# Patient Record
Sex: MALE | Employment: STUDENT | URBAN - METROPOLITAN AREA
[De-identification: names, ages, dates, MRNs, and addresses within clinical notes are randomized per-mention and may not be internally consistent; named-entity substitution may affect disease eponyms.]

---

## 2023-01-19 ENCOUNTER — ATHLETIC TRAINING SESSION (OUTPATIENT)
Dept: SPORTS MEDICINE | Facility: CLINIC | Age: 23
End: 2023-01-19
Payer: COMMERCIAL

## 2023-01-19 NOTE — PROGRESS NOTES
[facility]                      [facility]       Weekly Treatment Log       Name: Chinmay Cruz  Clinic Number: [id number]  Sport: Men's Baseball    ______________________________________________________________________    Visit Date: 1/20/23    Reason For Visit: Rehab  Affected Area: Right Shoulder    Chinmay completed the following exercises, treatments, and modalities.     Warm-Up Reps/Sets/Time Weight #   Heat 10 min         Ice (Post)                 Modality/Manual Therapy Time   Fascial Cupping 10 min                               Exercise Reps/Sets/Time Weight #   Shoulder Isometrics 3 x 10 ea    Body Blade 3 x 30 sec    Wall Slides 3 x 10                     Patient reports feeling okay. He threw lightly today at about 50% and it didn't feel great, but he was able to do. Plan to rest this weekend and do isometrics. Will reassess on Monday.     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 He originally believed it to be soreness or ramping up too hard, but now it has developed into moderate pain. He reports he has no pain when lifting, only when throwing or trying to pitch, even at 30-40% effort. He reports that he has no numbness or tingling and the pain is a dull achy pain, but sometimes is sharp when he throws. Most of his pain with motion is with external rotation. He reports there was no specific mechanism or throw that made it start, it just randomly came on.       Objective     Observations:  No bruising, swelling, or obvious deformity. POP over distal deltoid    ROM:  Forward Flexion: 5/5  Extension: 5/5  Abduction: 5/5  Adduction:  5/5  Horizontal Flexion: 4/5  Scaption: 4/5  Internal Rotation: 5/5  External Rotation: 4/5  Elevation: 5/5  Retraction: 5/5  Protraction: 5/5      MMTs:  Forward Flexion: 5/5  Extension: 5/5  Abduction: 4/5  Adduction: 5/5  Horizontal Flexion: 4/5  Scaption: 4/5  Internal Rotation: 5/5  External Rotation: 3/5  Elevation: 5/5  Retraction: 5/5  Protraction: 5/5      Special Tests:  Vinicius-Oliver: Positive  Yergason's: Positive  Conway (Active Compression): Positive  Biceps Load I: Negative  Biceps Load II: Negative  Pain Provocation: Negative  Dynamic Labral Shear: N/A  Empty Can: Positive  Apprehension: Negative  Neer: Positive  Speed: Positive  AC Shear: Negative  Sulcus Sign: Negative  Adson: N/A  Natalio: N/A   Brace: N/A  Tracey: N/A    Neuro Exam:  Not warranted at this time               Assessment     Initial Impression:  Likely impingement syndrome     Differential Diagnosis:  Deltoid sprain, external rotator injury      Plan     Plan to lower intensity of throwing and begin rehab. Plan to focus on reducing pain at deltoid if needed while increasing scapular control and appropriately increasing subacromial space.      Patient has been given shoulder isometric exercises to complete on his own over the weekend as well as exercises to do in the ATF.     Will refer if pain does not significantly improve with treatment plan.        King MAGALLANES, LAT, ATC, BRUNILDA    [facility] [place]

## 2023-01-24 ENCOUNTER — ATHLETIC TRAINING SESSION (OUTPATIENT)
Dept: SPORTS MEDICINE | Facility: CLINIC | Age: 23
End: 2023-01-24
Payer: COMMERCIAL

## 2023-01-25 DIAGNOSIS — M25.511 RIGHT SHOULDER PAIN, UNSPECIFIED CHRONICITY: Primary | ICD-10-CM

## 2023-01-26 ENCOUNTER — OFFICE VISIT (OUTPATIENT)
Dept: SPORTS MEDICINE | Facility: CLINIC | Age: 23
End: 2023-01-26
Payer: COMMERCIAL

## 2023-01-26 ENCOUNTER — APPOINTMENT (OUTPATIENT)
Dept: RADIOLOGY | Facility: OTHER | Age: 23
End: 2023-01-26
Attending: ORTHOPAEDIC SURGERY
Payer: COMMERCIAL

## 2023-01-26 VITALS
BODY MASS INDEX: 22.53 KG/M2 | DIASTOLIC BLOOD PRESSURE: 62 MMHG | WEIGHT: 170 LBS | SYSTOLIC BLOOD PRESSURE: 139 MMHG | HEIGHT: 73 IN

## 2023-01-26 DIAGNOSIS — M99.08 SOMATIC DYSFUNCTION OF RIB CAGE REGION: ICD-10-CM

## 2023-01-26 DIAGNOSIS — M25.511 ACUTE PAIN OF RIGHT SHOULDER: Primary | ICD-10-CM

## 2023-01-26 DIAGNOSIS — M99.02 SOMATIC DYSFUNCTION OF THORACIC REGION: ICD-10-CM

## 2023-01-26 DIAGNOSIS — M99.07 SOMATIC DYSFUNCTION OF UPPER EXTREMITY: ICD-10-CM

## 2023-01-26 DIAGNOSIS — M25.511 RIGHT SHOULDER PAIN, UNSPECIFIED CHRONICITY: ICD-10-CM

## 2023-01-26 DIAGNOSIS — M25.811 SHOULDER IMPINGEMENT, RIGHT: ICD-10-CM

## 2023-01-26 PROCEDURE — 73030 X-RAY EXAM OF SHOULDER: CPT | Mod: TC,RT

## 2023-01-26 PROCEDURE — 97110 THERAPEUTIC EXERCISES: CPT | Mod: S$GLB,,, | Performed by: ORTHOPAEDIC SURGERY

## 2023-01-26 PROCEDURE — 3078F PR MOST RECENT DIASTOLIC BLOOD PRESSURE < 80 MM HG: ICD-10-PCS | Mod: CPTII,S$GLB,, | Performed by: ORTHOPAEDIC SURGERY

## 2023-01-26 PROCEDURE — 73030 XR SHOULDER COMPLETE 2 OR MORE VIEWS RIGHT: ICD-10-PCS | Mod: 26,RT,, | Performed by: RADIOLOGY

## 2023-01-26 PROCEDURE — 99204 PR OFFICE/OUTPT VISIT, NEW, LEVL IV, 45-59 MIN: ICD-10-PCS | Mod: 25,S$GLB,, | Performed by: ORTHOPAEDIC SURGERY

## 2023-01-26 PROCEDURE — 73030 X-RAY EXAM OF SHOULDER: CPT | Mod: 26,RT,, | Performed by: RADIOLOGY

## 2023-01-26 PROCEDURE — 98926 PR OSTEOPATHIC MANIP,3-4 BODY REGN: ICD-10-PCS | Mod: S$GLB,,, | Performed by: ORTHOPAEDIC SURGERY

## 2023-01-26 PROCEDURE — 3075F PR MOST RECENT SYSTOLIC BLOOD PRESS GE 130-139MM HG: ICD-10-PCS | Mod: CPTII,S$GLB,, | Performed by: ORTHOPAEDIC SURGERY

## 2023-01-26 PROCEDURE — 1159F MED LIST DOCD IN RCRD: CPT | Mod: CPTII,S$GLB,, | Performed by: ORTHOPAEDIC SURGERY

## 2023-01-26 PROCEDURE — 1160F RVW MEDS BY RX/DR IN RCRD: CPT | Mod: CPTII,S$GLB,, | Performed by: ORTHOPAEDIC SURGERY

## 2023-01-26 PROCEDURE — 3078F DIAST BP <80 MM HG: CPT | Mod: CPTII,S$GLB,, | Performed by: ORTHOPAEDIC SURGERY

## 2023-01-26 PROCEDURE — 97110 PR THERAPEUTIC EXERCISES: ICD-10-PCS | Mod: S$GLB,,, | Performed by: ORTHOPAEDIC SURGERY

## 2023-01-26 PROCEDURE — 99204 OFFICE O/P NEW MOD 45 MIN: CPT | Mod: 25,S$GLB,, | Performed by: ORTHOPAEDIC SURGERY

## 2023-01-26 PROCEDURE — 99999 PR PBB SHADOW E&M-EST. PATIENT-LVL III: CPT | Mod: PBBFAC,,, | Performed by: ORTHOPAEDIC SURGERY

## 2023-01-26 PROCEDURE — 3008F BODY MASS INDEX DOCD: CPT | Mod: CPTII,S$GLB,, | Performed by: ORTHOPAEDIC SURGERY

## 2023-01-26 PROCEDURE — 3008F PR BODY MASS INDEX (BMI) DOCUMENTED: ICD-10-PCS | Mod: CPTII,S$GLB,, | Performed by: ORTHOPAEDIC SURGERY

## 2023-01-26 PROCEDURE — 3075F SYST BP GE 130 - 139MM HG: CPT | Mod: CPTII,S$GLB,, | Performed by: ORTHOPAEDIC SURGERY

## 2023-01-26 PROCEDURE — 1160F PR REVIEW ALL MEDS BY PRESCRIBER/CLIN PHARMACIST DOCUMENTED: ICD-10-PCS | Mod: CPTII,S$GLB,, | Performed by: ORTHOPAEDIC SURGERY

## 2023-01-26 PROCEDURE — 1159F PR MEDICATION LIST DOCUMENTED IN MEDICAL RECORD: ICD-10-PCS | Mod: CPTII,S$GLB,, | Performed by: ORTHOPAEDIC SURGERY

## 2023-01-26 PROCEDURE — 98926 OSTEOPATH MANJ 3-4 REGIONS: CPT | Mod: S$GLB,,, | Performed by: ORTHOPAEDIC SURGERY

## 2023-01-26 PROCEDURE — 99999 PR PBB SHADOW E&M-EST. PATIENT-LVL III: ICD-10-PCS | Mod: PBBFAC,,, | Performed by: ORTHOPAEDIC SURGERY

## 2023-01-26 RX ORDER — MELOXICAM 15 MG/1
15 TABLET ORAL DAILY
Qty: 30 TABLET | Refills: 0 | Status: SHIPPED | OUTPATIENT
Start: 2023-01-26

## 2023-01-26 NOTE — PROGRESS NOTES
"CC: right shoulder pain    22 y.o. Male presents today for evaluation of his right shoulder pain. 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 He notes increased pain with throwing and denies pain with lifting. He gestures to the anterior aspect of the right shoulder when asked where he hurts. He is accompanied today by his  who was present for the duration of the visit today.   How long: Approximately 2 weeks  What makes it better: Rest, lowering his throwing intensity, occasionally with lifting overhead  What makes it worse: Throwing above 50-60% intensity  Does it radiate: Denies  Attempted treatments: He has been working with his  including cupping, E-stim, and stabilizing/strengthening exercises for the shoulder. He denies taking medications for this problem.   Pain score: 1/10  Any mechanical symptoms: Denies  Feelings of instability: Denies  Affecting ADLs: Denies. Only feels when throwing >50% velocity.    PAST MEDICAL HISTORY:   History reviewed. No pertinent past medical history.    PAST SURGICAL HISTORY:   History reviewed. No pertinent surgical history.    FAMILY HISTORY:   History reviewed. No pertinent family history.    SOCIAL HISTORY:   Social History     Socioeconomic History    Marital status: Unknown       MEDICATIONS:     Current Outpatient Medications:     meloxicam (MOBIC) 15 MG tablet, Take 1 tablet (15 mg total) by mouth once daily., Disp: 30 tablet, Rfl: 0    ALLERGIES:   Review of patient's allergies indicates:  No Known Allergies     PHYSICAL EXAMINATION:  /62   Ht 6' 1" (1.854 m)   Wt 77.1 kg (170 lb)   BMI 22.43 kg/m²   Vitals signs and nursing note have been reviewed.  General: In no acute distress, well developed, well nourished, no diaphoresis  Eyes: EOM full and smooth, no eye redness or discharge  HENT: normocephalic and atraumatic, neck supple, trachea " midline, no nasal discharge, no external ear redness or discharge  Cardiovascular: 2+ and symmetric radial bilaterally, no LE edema  Lungs: respirations non-labored, no conversational dyspnea   Abd: non-distended, no rigidity  MSK: no amputation or deformity, no swelling of extremities  Neuro: AAOx3, CN2-12 grossly intact  Skin: No rashes, warm and dry  Psychiatric: cooperative, pleasant, mood and affect appropriate for age    SHOULDER: RIGHT  The affected shoulder is compared to the contralateral shoulder.    Observation:    CERVICAL SPINE  Normal head carriage. Normal thoracic kyphosis.  Full AROM in flexion, extension, sidebending, and rotation.    SHOULDER  No ecchymosis, edema, or erythema throughout the shoulder girdle.  No sternal, clavicular, or acromial deformities bilaterally.  No atrophy of the pectorals, deltoids, supraspinatus, infraspinatus, or biceps bilaterally.  No asymmetry of shoulders bilaterally.    ROM:  Active flexion to 180° on left and 180° on right.   Active abduction to 180° on left and 180° on right.    Active internal rotation to T7 on left and T7 on right.    Active external rotation to T4 on left and T4 on right.    No scapular dyskinesia or winging.    Tenderness:  No tenderness at the SC or AC joint  No tenderness over the clavicle   No tenderness over biceps tendon in the bicipital groove  No tenderness over subacromial space  + tenderness over the posterior lateral scapula  + tenderness just lateral to the biceps tendon anterior shoulder    Strength Testing:  Deltoid - 5/5 on left and 5/5 on right  Biceps - 5/5 on left and 5/5 on right  Triceps - 5/5 on left and 5/5 on right  Wrist extension - 5/5 on left and 5/5 on right  Wrist flexion - 5/5 on left and 5/5 on right   - 5/5 on left and 5/5 on right  Finger extension - 5/5 on left and 5/5 on right  Finger abduction - 5/5 on left and 5/5 on right    Special Tests:  Empty can test - negative  Full can test - negative  Bear hug  test - negative  Belly press test - negative  Resisted internal rotation - negative  Resisted external rotation - negative    Neer's test - negative  Hawkin's-Oliver test - positive    OAbdiels test - negative    Biceps load 1 test - negative  Biceps load 2 test - negative  Staples sheer test - negative    Speed's test - negative  Yergason's test - negative    Sulcus sign - none  AP load and shift laxity - none  Anterior apprehension test - negative    Posture:  Upright and Anterior head carriage  Gait: Non-antalgic     TART (Tissue texture abnormality, Asymmetry,  Restriction of motion and/or Tenderness) changes:    Head:     Cervical Spine  Thoracic Spine  Lumbar Spine   C1 Neutral T1 ERSR L1 Neutral   C2 Neutral T2 ERSR L2 Neutral   C3 Neutral T3 Neutral L3 Neutral   C4 Neutral T4 Neutral L4 Neutral   C5 Neutral T5 NSRRL L5 Neutral   C6 Neutral T6 NSRRL     C7 Neutral T7 NSRRL       T8 NSRRL       T9 Neutral       T10 Neutral       T11 Neutral       T12 Neutral       Ribs:  Superior rib 1 on the right  Myofascial restriction right upper ribcage    Upper Extremity:  Periscapular myofascial restriction on the right  Fascial herniated trigger points at the lateral superior scapular border  Fascial trigger bands lateral upper right arm  Myofascial restriction right pec muscle    Abdomen:    Pelvis:    Sacrum:    Lower Extremity:      Key   F= Flexed   E = Extended   R = Rotated   N = Neutral   S = Sidebent   TTA = tissue texture abnormality   L/R/B = left/right/bilateral (last letter)       Neurovascular Exam:  2+ radial pulses BL  Sensation intact to light touch in the distal median, radial, and ulnar nerve distributions bilaterally.  Spurlings test - negative  Lhermittes test - negative  Capillary refill intact <2 seconds in all digits bilaterally      IMAGIN. X-ray ordered due to right shoulder pain   2. X-ray images were reviewed personally by me and then directly with patient.  3. FINDINGS: X-ray images  obtained demonstrate no fracture or dislocation, no joint space narrowing  4. IMPRESSION: As above.       ASSESSMENT:      ICD-10-CM ICD-9-CM   1. Acute pain of right shoulder  M25.511 719.41   2. Shoulder impingement, right  M75.41 726.2   3. Somatic dysfunction of upper extremity  M99.07 739.7   4. Somatic dysfunction of thoracic region  M99.02 739.2   5. Somatic dysfunction of rib cage region  M99.08 739.8         PLAN:  1-2. Acute right shoulder pain/impingement -     - Jack is a [employer]  who admits to right anterior shoulder pain beginning approximately 2 weeks ago without known mechanism of injury. He notes increased pain when throwing above 50-60% velocity.      - XRs ordered in the office today and images were personally reviewed with the patient. See above for further detail.    - Symptoms, exam, and imaging are most consistent with impingement syndrome of the right shoulder secondary to poor body mechanics/neuromuscular firing. Labral testing did not elicit symptoms today.  We discussed the importance of decreasing inflammation and strengthening and stabilizing to help promote and maintain symptom improvement/resolution.  This is commonly accomplished with a short course of an anti-inflammatory and icing in addition to osteopathic manipulation, a home exercise program or physical therapy.    - Based on his description of pain/body language and somatic dysfunction identified on exam, I discussed osteopathic manipulation as a treatment option today. He consents to evaluation and treatment. See below.     - Meloxicam 15 mg daily for 2 weeks followed by as needed.    - Continue working with his athletic training on shoulder strengthening. Include cervicothoracic mobility.     - His  was present for the duration of the visit today and expressed understanding and agreement to the plan.       3-5. Somatic dysfunction of thoracic, rib, upper extremity regions -     - OMT 3-4 regions.  Oral consent obtained. Reviewed benefits and potential side effects. OMT indicated today due to signs and symptoms as well as local and remote somatic dysfunction findings and their related neurokinetic, lymphatic, fascial and/or arteriovenous body connections. OMT techniques used: Soft Tissue, Myofascial Release, Muscle Energy, High Velocity Low Amplitude, Still's Technique, and Fascial Distortion Model. Treatment was tolerated well. Improvement noted in segmental mobility post-treatment in dysfunctional regions. There were no adverse events and no complications immediately following treatment. Advised plenty of water to help alleviate soreness.      Future planning includes - possibly more OMT if helpful and if indicated, add formal PT    All questions were answered to the best of my ability and all concerns were addressed at this time.    Follow up in 1-2 weeks for above in the [facility] athletic training room, or sooner if needed.      This note is dictated using the M*Modal Fluency Direct word recognition program. There are word recognition mistakes that are occasionally missed on review.      Total time spent face-to face with patient counseling or coordinating care including prognosis, differential diagnosis, risks and benefits of treatment, instructions, compliance risk reductions as well as non-face-to-face time personally spent reviewing medial record, medical documentation, and coordination of care.     EST MINUTES X   [id number] 10-19    82335 20-29    75341 30-39    15353 40-54    NEW     34137 15-29    26876 30-44    03441 45-59 X   99205 60-74    PHONE      5-10    [id number] 11-20    93999 21-30

## 2023-01-30 NOTE — PROGRESS NOTES
[facility]                      [facility]       Weekly Treatment Log       Name: Neftaly Cruz  [facility] Number: [id number]  Sport: Men's Baseball    ___________________________________________________________    Visit Date: 1/28/2023    Reason For Visit: Rehab  Affected Area: Right Shoulder    Patient reports feeling not good. He reports his outing hurt very bad and he was about 6 mph lower than his normal velocity. He did report he felt like he had control of the ball and was able to put it where he wanted it, he just had pain coming over the top. He didn't throw a ton of breaking balls to avoid over producing pain. Plan to continue with rehab plan and will touch base with Dr. Tejeda again this week.  ___________________________________________________________    Visit Date: 1/27/2023    Reason For Visit: Rehab  Affected Area: Right Shoulder    Chinmay completed the following exercises, treatments, and modalities.     Warm-Up Reps/Sets/Time Weight #   Heat     Stretch                      Modality/Manual Therapy Time   Fascial Cupping 10 min                 Exercise Reps/Sets/Time Weight #   Shoulder Isometrics 3 x 10                Patient reports feeling decent. He reports not wanting to do a ton of rehab today so he doesn't overdo it to try and throw his live outing tomorrow.  ___________________________________________________________    Visit Date: 1/26/2023    Reason For Visit: Rehab  Affected Area: Right Shoulder    Chinmay completed the following exercises, treatments, and modalities.     Warm-Up Reps/Sets/Time Weight #   Heat     Stretch                        Exercise Reps/Sets/Time Weight #   Shoulder Isometrics 3 x 10    Standing ITYs 3 x 10    Prone Rows 3 x 10    Body Blade 3 x 30 sec    Banded IR/ER 3 x 10                     Patient reports feeling better after seeing Dr. Tejeda for the OMT. He felt immediate relief after the appointment and threw  a low volume at 70% comfortably without pain. Will reassess for his live outing on Saturday.  ___________________________________________________________    Visit Date: 1/25/2023    Reason For Visit: Rehab  Affected Area: Right Shoulder    Chinmay completed the following exercises, treatments, and modalities.     Warm-Up Reps/Sets/Time Weight #   Heat     Stretch                      Modality/Manual Therapy Time   Fascial Cupping 10 min   Ice Post throw              Patient reports feeling decent. 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 Tejeda tomorrow.  ___________________________________________________________    Visit Date: 1/24/2023    Reason For Visit: Rehab  Affected Area: Right Shoulder    Chinmay completed the following exercises, treatments, and modalities.     Warm-Up Reps/Sets/Time Weight #   Heat     Stretch                      Exercise Reps/Sets/Time Weight #   Shoulder Isometrics 3 x 10    Standing ITYs 3 x 10    Prone Rows 3 x 10    Body Blade 3 x 30 sec                          Patient reports feeling a little better today. He reports he can get up to about 60% intensity without any issues. Will try and throw his bullpen tomorrow even if it is at low intensity.  ___________________________________________________________    Visit Date: 1/23/2023    Reason For Visit: Rehab  Affected Area: Right Shoulder    Chinmay completed the following exercises, treatments, and modalities.     Warm-Up Reps/Sets/Time Weight #   Heat     Stretch                      Modality/Manual Therapy Time   Fascial Cupping 10 min                 Exercise Reps/Sets/Time Weight #   Shoulder Isometrics 3 x 10    Standing ITYs 3 x 10    Prone Rows 3 x 10    Body Blade 3 x 30 sec                          Patient reports feeling okay today, not great bunch not much worse. Throwing still doesn't feel very good over about 50% intensity. Plan to continue rehab plan and  refer to Dr. Tejeda this week.      King MAGALLANES, LAT, ATC, BRUNILDA    [facility] [place]

## 2023-02-01 ENCOUNTER — TELEPHONE (OUTPATIENT)
Dept: SPORTS MEDICINE | Facility: CLINIC | Age: 23
End: 2023-02-01
Payer: COMMERCIAL

## 2023-02-01 DIAGNOSIS — M25.519 PAIN IN UNSPECIFIED SHOULDER: ICD-10-CM

## 2023-02-01 DIAGNOSIS — M25.811 SHOULDER IMPINGEMENT, RIGHT: ICD-10-CM

## 2023-02-01 DIAGNOSIS — M25.511 ACUTE PAIN OF RIGHT SHOULDER: ICD-10-CM

## 2023-02-01 NOTE — TELEPHONE ENCOUNTER
Athletic Training Room Note 02/01/2023  [facility]    : King Gill    Physician: Nolan Tejeda DO      CC: Right shoulder pain     HPI: Chinmay is a [employer]  with complaints of right shoulder pain that has not improved with conservative treatments including OMT, NSAIDs, and consistent rehab with his . He gestures to the posterior aspect of the right shoulder when asked where he hurts today.  He denies any worsening of symptoms since his in office visit a few weeks ago, but has not been able to increase his pitching velocity as much as he would like.  He denies any elbow pain and denies any wrist pain.  Per his , there has been no change in his throwing mechanics and he is not compensating because of the pain.    Physical Exam:  Shoulder: right  The affected shoulder is compared to the contralateral shoulder.    Observation:    CERVICAL SPINE  Normal head carriage. + thoracic kyphosis.  Full AROM in flexion, extension, sidebending, and rotation.    SHOULDER  No ecchymosis, edema, or erythema throughout the shoulder girdle.  No sternal, clavicular, or acromial deformities bilaterally.  No atrophy of the pectorals, deltoids, supraspinatus, infraspinatus, or biceps bilaterally.  No asymmetry of shoulders bilaterally.    ROM:  Active flexion to 180° bilaterally.   Active abduction to 180° bilaterally.    Active internal rotation to T7 bilaterally.    Active external rotation to T4 bilaterally.    Scapular dyskinesia on the right    Tenderness:  No tenderness at the SC or AC joint  No tenderness over the clavicle   No tenderness over biceps tendon or bicipital groove  No tenderness over subacromial space  + tenderness along the posterior humeral head  + tenderness at the superior lateral scapular border    Strength Testing:  Deltoid - 5/5  Biceps - 5/5  Triceps - 5/5  Wrist extension - 5/5  Wrist flexion - 5/5    Special Tests:  Empty can test -  negative  Full can test - negative  Bear hug test - negative  Belly press test - negative  Resisted internal rotation - negative  Resisted external rotation - negative    Neer's test - negative  Hawkin's-Oliver test - positive    OAbdiels test - negative  Biceps load 1 test - negative  Biceps load 2 test - negative  Staples sheer test - negative    Speed's test - negative  Yergason's test - negative    Sulcus sign - none  AP load and shift laxity - none  Anterior apprehension test - negative  Posterior apprehension test - negative    Neurovascular Exam:  2+ radial pulses BL  Sensation intact to light touch in the distal median, radial, and ulnar nerve distributions bilaterally.  Capillary refill intact <2 seconds in all digits bilaterally    Assessment:  Right shoulder pain, most consistent with impingement but we need to rule out labral pathology      Plan:     He has not improved with conservative measures so far and is still struggling to return to his prior velocity due to his shoulder pain.    Medications - Continue with Mobic 15 mg daily as needed.     Exercises - Continue with scapular strengthening/stabilization    Referrals - Pending MRI results    Imaging - MRI-arthrogram right shoulder    Follow up - after MRI in office to discuss next steps.  If no intra-articular pathology is appreciated, we will continue with strengthening, stability, OMT if indicated.  If there is intra-articular pathology, we will discuss injection options.  If there is extensive damage that warrants at least a surgical discussion, we will set up that appointment.            This note was completed in the presence of the physician noted above    This note is dictated using the M*Modal Fluency Direct word recognition program. There are word recognition mistakes that are occasionally missed on review.

## 2023-02-05 ENCOUNTER — ATHLETIC TRAINING SESSION (OUTPATIENT)
Dept: SPORTS MEDICINE | Facility: CLINIC | Age: 23
End: 2023-02-05
Payer: COMMERCIAL

## 2023-02-06 ENCOUNTER — ATHLETIC TRAINING SESSION (OUTPATIENT)
Dept: SPORTS MEDICINE | Facility: CLINIC | Age: 23
End: 2023-02-06
Payer: COMMERCIAL

## 2023-02-06 NOTE — PROGRESS NOTES
[facility]                      [facility]     **This note is for the week of 1/29 to 2/4**    Weekly Treatment Log       Name: Neftaly Cruz  [facility] Number: [id number]  Sport: Men's Baseball    ___________________________________________________________    Visit Date: 2/4/23    Reason For Visit: Rehab  Affected Area: Right Shoulder    Chinmay completed the following exercises, treatments, and modalities.     Warm-Up Reps/Sets/Time Weight #   Heat w/ Stim 10 min                          Modality/Manual Therapy Time       Stretch 5 min   Jt. Mobs 5 min       Ice Post              Patient reports not feeling good. He reports he could not pitch without pain at all today. He tried to block it out if his mind and not focus on the pain, but he could not do it. It hurt too much. His velocity was back a little closer to normal, within 4mph, but he still hurt bad. Plan now is to shut down from throwing while continuing rehab and wait for his MRI and those results.  ___________________________________________________________    Visit Date: 2/3/23    Reason For Visit: Rehab  Affected Area: Right Shoulder    Chinmay completed the following exercises, treatments, and modalities.     Warm-Up Reps/Sets/Time Weight #   Heat 10 min                          Modality/Manual Therapy Time   Fascial Cupping 10 min   Stretch 5 min   Jt. Mobs 5 min       Ice Post              Patient reports feeling good today. He has had another day of getting to around 75% intensity and did not have any pain. He reports he is optimistic for tomorrow's outing based on how he has felt the last two to three days.    ___________________________________________________________    Visit Date: 2/2/23    Reason For Visit: Rehab  Affected Area: Right Shoulder    Chinmay completed the following exercises, treatments, and modalities.     Warm-Up Reps/Sets/Time Weight #   Heat 10 min                           Modality/Manual Therapy Time       Stretch 5 min   Jt. Mobs 5 min       Ice Post                Exercise Reps/Sets/Time Weight #   Wall [place] 3 x 10    Standing ITYs 3 x 12    Wall Circles 3 x 8    Iso Shoulders 3 x 12    Body Blade 3 x 30 sec    Banded IR/ER 3 x 12                                           Patient reports feeling decent today. He was able to throw with minimal pain today and was able to get up to about 70% intensity feeling good. Plan to continue rehab and assess for outing once we get to Saturday.  ___________________________________________________________    Visit Date: 1/31/23    Reason For Visit: Rehab  Affected Area: Right Shoulder    Chinmay completed the following exercises, treatments, and modalities.     Warm-Up Reps/Sets/Time Weight #   Heat 10 min                          Modality/Manual Therapy Time   Fascial Cupping 10 min   Stretch 5 min   Jt. Mobs 5 min       Ice Post                Exercise Reps/Sets/Time Weight #   Wall [place] 3 x 10    Standing ITYs 3 x 12    Prone Rows 3 x 12    Iso Shoulders 3 x 12    Body Blade 3 x 30 sec                Patient reports feeling no change from yesterday.  ___________________________________________________________    Visit Date: 1/30/23    Reason For Visit: Rehab  Affected Area: Right Shoulder    Chinmay completed the following exercises, treatments, and modalities.     Warm-Up Reps/Sets/Time Weight #   Heat 10 min                          Modality/Manual Therapy Time   Fascial Cupping 10 min   Stretch 5 min   Jt. Mobs 5 min       Ice Post                Exercise Reps/Sets/Time Weight #   Wall [place] 3 x 10    Standing ITYs 3 x 12    Prone Rows 3 x 12    Iso Shoulders 3 x 12    Body Blade 3 x 30 sec                                                Patient reports feeling not great today when throwing. His outing wasn't good over the weekend and he is worried he isn't moving in the right direction. Plan to reassess on 2/1 with Dr. Nolan Tejeda in  [facility].         King MAGALLANES, LAT, ATC, BRUNILDA    [facility] [place]

## 2023-02-06 NOTE — PROGRESS NOTES
[facility]                      [facility]       Weekly Treatment Log       Name: Neftaly Cruz  [facility] Number: [id number]  Sport: Men's Baseball    ___________________________________________________________    Visit Date: 2/11/2023    Reason For Visit: Rehab  Affected Area: Right Shoulder    Chinmay completed the following exercises, treatments, and modalities.     Warm-Up Reps/Sets/Time Weight #   Heat                           Modality/Manual Therapy Time   Stretch 5 min   Jt Mobs 5 min                           Exercise Reps/Sets/Time Weight #   Banded ER Walkouts 3 x 12    Banded IR Walkouts 3 x 12    Prone ITYs 3 x 12    Prone Ret w/ IR 3 x 12    No Monies 3 x 12    Rhythm Stab w/ band in ER 3 x 30 sec    Body Blade 3 x 30 sec                                      Patient reports feeling good.  ___________________________________________    Visit Date: 2/10/2023    Affected Area: Right Shoulder    Objective     Patient missed his MRI      Plan     Plan to try and reschedule. Will continue rehab in the meantime.   ___________________________________________________________    Visit Date: 2/9/2023    Reason For Visit: Rehab  Affected Area: Right Shoulder    Chinmay completed the following exercises, treatments, and modalities.     Warm-Up Reps/Sets/Time Weight #   Heat                           Modality/Manual Therapy Time   Stretch 5 min   Jt Mobs 5 min                           Exercise Reps/Sets/Time Weight #   Banded ER Walkouts 3 x 12    Banded IR Walkouts 3 x 12    Prone ITYs 3 x 12    Prone Ret w/ IR 3 x 12    No Monies 3 x 12    Rhythm Stab w/ band in ER 3 x 30 sec    Body Blade 3 x 30 sec                                      Patient reports feeling good today. He still has no issues outside of throwing and feels like his shoulder is stronger. He is ready to get his MRI tomorrow and see what is going on in the shoulder and start the next  steps.  ___________________________________________________________    Visit Date: 2/7/2023    Reason For Visit: Rehab  Affected Area: Right Shoulder    Chinmay completed the following exercises, treatments, and modalities.     Warm-Up Reps/Sets/Time Weight #   Heat                           Modality/Manual Therapy Time   Stretch 5 min   Jt Mobs 5 min   E-Stim                        Exercise Reps/Sets/Time Weight #   Banded ER Walkouts 3 x 12    Banded IR Walkouts 3 x 12    Prone ITYs 3 x 12    Prone Ret w/ IR 3 x 12    No Monies 3 x 12    Rhythm Stab w/ PB 3 x 30 sec    Body Blade 3 x 30 sec                                      Patient reports no change from yesterday  ___________________________________________________________    Visit Date: 2/6/2023    Reason For Visit: Rehab  Affected Area: Right Shoulder    Chinmay completed the following exercises, treatments, and modalities.     Warm-Up Reps/Sets/Time Weight #   Heat                           Modality/Manual Therapy Time   Stretch 5 min   Jt Mobs 5 min                           Exercise Reps/Sets/Time Weight #   Banded ER Walkouts 3 x 12    Banded IR Walkouts 3 x 12    Prone ITYs 3 x 12    Prone Ret w/ IR 3 x 12    No Monies 3 x 12    Rhythm Stab w/ PB 3 x 30 sec    Body Blade 3 x 30 sec                                      Patient reports feeling not much different than he did the previous days, but he does feel stronger. He can do a dry throwing motion without pain, but hasn't attempted to throw at all. He still has no issues in the weight room.        King MAGALLANES, LAT, ATC, BRUNILDA    [employer] [facility] [place]

## 2023-02-13 DIAGNOSIS — M25.511 ACUTE PAIN OF RIGHT SHOULDER: ICD-10-CM

## 2023-02-13 DIAGNOSIS — M25.519 PAIN IN UNSPECIFIED SHOULDER: ICD-10-CM

## 2023-02-17 ENCOUNTER — HOSPITAL ENCOUNTER (OUTPATIENT)
Dept: RADIOLOGY | Facility: HOSPITAL | Age: 23
Discharge: HOME | End: 2023-02-17
Attending: ORTHOPAEDIC SURGERY
Payer: COMMERCIAL

## 2023-02-17 DIAGNOSIS — M25.811 SHOULDER IMPINGEMENT, RIGHT: ICD-10-CM

## 2023-02-17 DIAGNOSIS — M25.519 PAIN IN UNSPECIFIED SHOULDER: ICD-10-CM

## 2023-02-17 DIAGNOSIS — M25.511 ACUTE PAIN OF RIGHT SHOULDER: ICD-10-CM

## 2023-02-17 PROCEDURE — 77002 NEEDLE LOCALIZATION BY XRAY: CPT | Mod: 26,RT,, | Performed by: RADIOLOGY

## 2023-02-17 PROCEDURE — 73222 MRI ARTHROGRAM SHOULDER WITH CONTRAST RIGHT: ICD-10-PCS | Mod: 26,RT,, | Performed by: RADIOLOGY

## 2023-02-17 PROCEDURE — 73222 MRI JOINT UPR EXTREM W/DYE: CPT | Mod: TC,RT

## 2023-02-17 PROCEDURE — 73222 MRI JOINT UPR EXTREM W/DYE: CPT | Mod: 26,RT,, | Performed by: RADIOLOGY

## 2023-02-17 PROCEDURE — 25500020 PHARM REV CODE 255: Performed by: ORTHOPAEDIC SURGERY

## 2023-02-17 PROCEDURE — A9585 GADOBUTROL INJECTION: HCPCS | Performed by: ORTHOPAEDIC SURGERY

## 2023-02-17 PROCEDURE — 77002 XR ARTHROGRAM SHOULDER RIGHT, INJECTION ONLY WITH MRI TO FOLLOW (XPD): ICD-10-PCS | Mod: 26,RT,, | Performed by: RADIOLOGY

## 2023-02-17 PROCEDURE — 23350 XR ARTHROGRAM SHOULDER RIGHT, INJECTION ONLY WITH MRI TO FOLLOW (XPD): ICD-10-PCS | Mod: RT,,, | Performed by: RADIOLOGY

## 2023-02-17 PROCEDURE — 23350 INJECTION FOR SHOULDER X-RAY: CPT | Mod: RT,,, | Performed by: RADIOLOGY

## 2023-02-17 PROCEDURE — 25000003 PHARM REV CODE 250: Performed by: ORTHOPAEDIC SURGERY

## 2023-02-17 RX ORDER — GADOBUTROL 604.72 MG/ML
5 INJECTION INTRAVENOUS
Status: COMPLETED | OUTPATIENT
Start: 2023-02-17 | End: 2023-02-17

## 2023-02-17 RX ORDER — LIDOCAINE HYDROCHLORIDE 10 MG/ML
4 INJECTION INFILTRATION; PERINEURAL ONCE
Status: COMPLETED | OUTPATIENT
Start: 2023-02-17 | End: 2023-02-17

## 2023-02-17 RX ADMIN — LIDOCAINE HYDROCHLORIDE 4 ML: 10 INJECTION, SOLUTION INFILTRATION; PERINEURAL at 01:02

## 2023-02-17 RX ADMIN — GADOBUTROL 5 ML: 604.72 INJECTION INTRAVENOUS at 01:02

## 2023-02-17 RX ADMIN — IOHEXOL 7 ML: 300 INJECTION, SOLUTION INTRAVENOUS at 01:02

## 2023-02-20 ENCOUNTER — OFFICE VISIT (OUTPATIENT)
Dept: SPORTS MEDICINE | Facility: CLINIC | Age: 23
End: 2023-02-20
Payer: COMMERCIAL

## 2023-02-20 VITALS
WEIGHT: 170 LBS | BODY MASS INDEX: 22.53 KG/M2 | HEIGHT: 73 IN | SYSTOLIC BLOOD PRESSURE: 125 MMHG | DIASTOLIC BLOOD PRESSURE: 75 MMHG | HEART RATE: 79 BPM

## 2023-02-20 DIAGNOSIS — M25.811 SHOULDER IMPINGEMENT, RIGHT: ICD-10-CM

## 2023-02-20 DIAGNOSIS — M25.511 ACUTE PAIN OF RIGHT SHOULDER: Primary | ICD-10-CM

## 2023-02-20 DIAGNOSIS — M67.819 TENDINOSIS OF ROTATOR CUFF: ICD-10-CM

## 2023-02-20 DIAGNOSIS — S43.431A LABRAL TEAR OF SHOULDER, RIGHT, INITIAL ENCOUNTER: ICD-10-CM

## 2023-02-20 PROCEDURE — 20611 PR DRAIN/ASP/INJECT MAJOR JOINT/BURSA W/US GUIDANCE: ICD-10-PCS | Mod: RT,S$GLB,, | Performed by: ORTHOPAEDIC SURGERY

## 2023-02-20 PROCEDURE — 99214 PR OFFICE/OUTPT VISIT, EST, LEVL IV, 30-39 MIN: ICD-10-PCS | Mod: 25,S$GLB,, | Performed by: ORTHOPAEDIC SURGERY

## 2023-02-20 PROCEDURE — 3074F SYST BP LT 130 MM HG: CPT | Mod: CPTII,S$GLB,, | Performed by: ORTHOPAEDIC SURGERY

## 2023-02-20 PROCEDURE — 1159F MED LIST DOCD IN RCRD: CPT | Mod: CPTII,S$GLB,, | Performed by: ORTHOPAEDIC SURGERY

## 2023-02-20 PROCEDURE — 3078F PR MOST RECENT DIASTOLIC BLOOD PRESSURE < 80 MM HG: ICD-10-PCS | Mod: CPTII,S$GLB,, | Performed by: ORTHOPAEDIC SURGERY

## 2023-02-20 PROCEDURE — 99999 PR PBB SHADOW E&M-EST. PATIENT-LVL III: CPT | Mod: PBBFAC,,, | Performed by: ORTHOPAEDIC SURGERY

## 2023-02-20 PROCEDURE — 20611 DRAIN/INJ JOINT/BURSA W/US: CPT | Mod: RT,S$GLB,, | Performed by: ORTHOPAEDIC SURGERY

## 2023-02-20 PROCEDURE — 99214 OFFICE O/P EST MOD 30 MIN: CPT | Mod: 25,S$GLB,, | Performed by: ORTHOPAEDIC SURGERY

## 2023-02-20 PROCEDURE — 3078F DIAST BP <80 MM HG: CPT | Mod: CPTII,S$GLB,, | Performed by: ORTHOPAEDIC SURGERY

## 2023-02-20 PROCEDURE — 1160F RVW MEDS BY RX/DR IN RCRD: CPT | Mod: CPTII,S$GLB,, | Performed by: ORTHOPAEDIC SURGERY

## 2023-02-20 PROCEDURE — 3008F PR BODY MASS INDEX (BMI) DOCUMENTED: ICD-10-PCS | Mod: CPTII,S$GLB,, | Performed by: ORTHOPAEDIC SURGERY

## 2023-02-20 PROCEDURE — 1160F PR REVIEW ALL MEDS BY PRESCRIBER/CLIN PHARMACIST DOCUMENTED: ICD-10-PCS | Mod: CPTII,S$GLB,, | Performed by: ORTHOPAEDIC SURGERY

## 2023-02-20 PROCEDURE — 1159F PR MEDICATION LIST DOCUMENTED IN MEDICAL RECORD: ICD-10-PCS | Mod: CPTII,S$GLB,, | Performed by: ORTHOPAEDIC SURGERY

## 2023-02-20 PROCEDURE — 3008F BODY MASS INDEX DOCD: CPT | Mod: CPTII,S$GLB,, | Performed by: ORTHOPAEDIC SURGERY

## 2023-02-20 PROCEDURE — 99999 PR PBB SHADOW E&M-EST. PATIENT-LVL III: ICD-10-PCS | Mod: PBBFAC,,, | Performed by: ORTHOPAEDIC SURGERY

## 2023-02-20 PROCEDURE — 3074F PR MOST RECENT SYSTOLIC BLOOD PRESSURE < 130 MM HG: ICD-10-PCS | Mod: CPTII,S$GLB,, | Performed by: ORTHOPAEDIC SURGERY

## 2023-02-20 RX ORDER — TRIAMCINOLONE ACETONIDE 40 MG/ML
40 INJECTION, SUSPENSION INTRA-ARTICULAR; INTRAMUSCULAR
Status: COMPLETED | OUTPATIENT
Start: 2023-02-20 | End: 2023-02-20

## 2023-02-20 RX ADMIN — TRIAMCINOLONE ACETONIDE 40 MG: 40 INJECTION, SUSPENSION INTRA-ARTICULAR; INTRAMUSCULAR at 10:02

## 2023-02-20 NOTE — PROGRESS NOTES
CC: right shoulder pain    Chinmay is here today for follow up evaluation of his right shoulder pain and to discuss his MRI results. Patient reports his pain is 0/10 today. He is accompanied today by his  who was present for the duration of the visit today. He states he has continued to work with his  doing rehab and scapular strengthening and has not returned to throwing. He denies new falls or trauma since his last visit.     Recall from visit on 01/26/2023  22 y.o. Male presents today for evaluation of his right shoulder pain. 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 He notes increased pain with throwing and denies pain with lifting. He gestures to the anterior aspect of the right shoulder when asked where he hurts. He is accompanied today by his  who was present for the duration of the visit today.   How long: Approximately 2 weeks  What makes it better: Rest, lowering his throwing intensity, occasionally with lifting overhead  What makes it worse: Throwing above 50-60% intensity  Does it radiate: Denies  Attempted treatments: He has been working with his  including cupping, E-stim, and stabilizing/strengthening exercises for the shoulder. He denies taking medications for this problem.   Pain score: 1/10  Any mechanical symptoms: Denies  Feelings of instability: Denies  Affecting ADLs: Denies. Only feels when throwing >50% velocity.    PAST MEDICAL HISTORY:   History reviewed. No pertinent past medical history.    PAST SURGICAL HISTORY:   History reviewed. No pertinent surgical history.    FAMILY HISTORY:   History reviewed. No pertinent family history.    SOCIAL HISTORY:   Social History     Socioeconomic History    Marital status: Unknown       MEDICATIONS:     Current Outpatient Medications:     meloxicam (MOBIC) 15 MG tablet, Take 1 tablet (15 mg total) by  "mouth once daily., Disp: 30 tablet, Rfl: 0  No current facility-administered medications for this visit.    ALLERGIES:   Review of patient's allergies indicates:  No Known Allergies     PHYSICAL EXAMINATION:  /75   Pulse 79   Ht 6' 1" (1.854 m)   Wt 77.1 kg (170 lb)   BMI 22.43 kg/m²   Vitals signs and nursing note have been reviewed.  General: In no acute distress, well developed, well nourished, no diaphoresis  Eyes: EOM full and smooth, no eye redness or discharge  HENT: normocephalic and atraumatic, neck supple, trachea midline, no nasal discharge, no external ear redness or discharge  Cardiovascular: 2+ and symmetric radial bilaterally, no LE edema  Lungs: respirations non-labored, no conversational dyspnea   Abd: non-distended, no rigidity  MSK: no amputation or deformity, no swelling of extremities  Neuro: AAOx3, CN2-12 grossly intact  Skin: No rashes, warm and dry  Psychiatric: cooperative, pleasant, mood and affect appropriate for age    SHOULDER: RIGHT  The affected shoulder is compared to the contralateral shoulder.    Observation:    CERVICAL SPINE  Anterior head carriage. Normal thoracic kyphosis.  Full AROM in flexion, extension, sidebending, and rotation.    SHOULDER  No ecchymosis, edema, or erythema throughout the shoulder girdle.  No sternal, clavicular, or acromial deformities bilaterally.  No atrophy of the pectorals, deltoids, supraspinatus, infraspinatus, or biceps bilaterally.  No asymmetry of shoulders bilaterally.    ROM:  Active flexion to 180° on left and 180° on right.   Active abduction to 180° on left and 180° on right.    Active internal rotation to T7 on left and T7 on right.    Active external rotation to T4 on left and T4 on right.    No scapular dyskinesia or winging.    Tenderness:  No tenderness at the SC or AC joint  No tenderness over the clavicle   No tenderness over biceps tendon in the bicipital groove  No tenderness over subacromial space  + tenderness over the " posterior lateral scapula  + tenderness just lateral to the biceps tendon anterior shoulder    Strength Testing:  Deltoid - 5/5 on left and 5/5 on right  Biceps - 5/5 on left and 5/5 on right  Triceps - 5/5 on left and 5/5 on right  Wrist extension - 5/5 on left and 5/5 on right  Wrist flexion - 5/5 on left and 5/5 on right   - 5/5 on left and 5/5 on right  Finger extension - 5/5 on left and 5/5 on right  Finger abduction - 5/5 on left and 5/5 on right    Special Tests:  Empty can test - negative  Full can test - negative  Bear hug test - negative  Belly press test - negative  Resisted internal rotation - negative  Resisted external rotation - negative    Neer's test - negative  Hawkin's-Oliver test - positive    OAbdiels test - negative    Biceps load 1 test - negative  Biceps load 2 test - negative  Staples sheer test - negative    Speed's test - negative  Yergason's test - negative    Sulcus sign - none  AP load and shift laxity - none  Anterior apprehension test - negative      Neurovascular Exam:  2+ radial pulses BL  Sensation intact to light touch in the distal median, radial, and ulnar nerve distributions bilaterally.  Spurlings test - negative  Lhermittes test - negative  Capillary refill intact <2 seconds in all digits bilaterally      IMAGIN. X-ray obtained 2023 due to right shoulder pain   2. X-ray images were reviewed personally by me and then directly with patient.  3. FINDINGS: X-ray images obtained demonstrate no fracture or dislocation, no joint space narrowing  4. IMPRESSION: As above.     1. MRI obtained 2023 due to right shoulder pain   2. MRI images were reviewed personally by me and then directly with patient.  3. FINDINGS: MRI images obtained demonstrate findings concerning for posterior impingement including SLAP tear, mild fraying and tendinosis of the articular surface conjoined area of the infraspinatus and supraspinatus tendon, osseous cystic changes and edema of the  posterolateral humeral head underlying the infraspinatus tendon attachment  4. IMPRESSION: As above.       PROCEDURE:  Large Joint Aspiration/Injection  Glenohumeral joint, right  Performed by: SHAHRZAD FRANCOIS  Authorized by: SHAHRZAD FRANCOIS  Consent Done?: Yes (Verbal)  Indications: Pain  Site marked: The procedure site was marked   Timeout: Prior to procedure the correct patient, procedure, and site was verified   Location: Glenohumeral joint, right  Prep: Patient was prepped and draped in usual sterile fashion   Ultrasonic Guidance for needle placement: yes  Needle size: 22G, 2.5  Approach: Posterior  Procedure: After skin anesthetic was applied, the needle was used to enter the GH joint under US guidance. A 3 cc mixture of 1 cc of 40 mg/ml triamcinolone acetonide and 2 cc of 0.2% ropivacaine was injected into the GH joint.   Medications: 40 mg triamcinolone acetonide 40 mg/mL  Patient tolerance: Patient tolerated the procedure well with no immediate complications    Description of ultrasound utilization for needle guidance:    Ultrasound guidance was used for needle localization with SonYuyutote Edge 2, 9-L MHz linear probe(s). Images were saved and stored for documentation. The glenohumeral  joint was well visualized. Dynamic visualization of the needle was continuous throughout the procedure and maintained good position and correct needle placement.      Triamcinolone:  NDC: [address]-5  LOT: [id number]  EXP: 09/2024       ASSESSMENT:      ICD-10-CM ICD-9-CM   1. Acute pain of right shoulder  M25.511 719.41   2. Labral tear of shoulder, right, initial encounter  S43.431A 840.8   3. Tendinosis of rotator cuff  M67.819 726.10   4. Shoulder impingement, right  M75.41 726.2           PLAN:  1-2. Acute right shoulder pain/impingement -   3-4.  Labral tear/rotator cuff tendinosis -     - Chinmay is a [employer]  who admits to right anterior shoulder pain beginning approximately 2 weeks ago without known  mechanism of injury. He notes increased pain when throwing above 50-60% velocity.  He has not returned to throwing over the past 2 weeks.     - MRI obtained 02/17/2023 and images were personally reviewed with the patient. See above for further detail.    - After discussing options, he elects to proceed with ultrasound guided right shoulder intra-articular injection. See above for procedure detail.     - Post injection instructions were provided and will gradually returned to pitching activity over the next 2-3 weeks and will continue to advance as tolerated.  I also advised that the injection done today can not only be diagnostic but also therapeutic and this may help for future treatment discussions based on how he responds to the injection.  If no change is noted with the injection today, we will switch focus to address the rotator cuff which would either be further PRP discussion likely in addition to discussion with surgeon.    - EDUCATION FOR PRP: Education provided on the benefits, adverse effects, likely course of treatment and improvement, and post-injection expectations from PRP. Handout given to patient. Reviewed that it is a non-covered cash service. It may takes several treatments to have long standing resolution. The PRP injection may include tenotomy, and this was explained to the patient. We reviewed that initially after treatment pain may become more intense and this is an expected response. We instructed that improvement may be experienced within the first two weeks and that most improvement will come between weeks 6 and 12. If there is no improvement, we would not likely repeat. If less than 90% improvement is experienced at 12 weeks, we would consider and discuss repeating.     - Continue with Meloxicam 15 mg as needed.    - Continue working with his athletic training on shoulder strengthening including cervicothoracic mobility.     - His  was present for the duration of the visit  today and expressed understanding and agreement to the plan.     - Advance pitching activity gradually over the next 2-3 weeks and as tolerated.      Future planning includes - possibly more OMT if helpful and if indicated, add formal PT    All questions were answered to the best of my ability and all concerns were addressed at this time.    Follow up as needed.       This note is dictated using the M*Modal Fluency Direct word recognition program. There are word recognition mistakes that are occasionally missed on review.      Total time spent face-to face with patient counseling or coordinating care including prognosis, differential diagnosis, risks and benefits of treatment, instructions, compliance risk reductions as well as non-face-to-face time personally spent reviewing medial record, medical documentation, and coordination of care.     EST MINUTES X   21426 10-19    [id number] 20-29    15295 30-39 X   99215 40-54    NEW     14947 15-29    36939 30-44    59703 45-59    [id number] 60-74    PHONE      5-10    [id number] 11-20    [id number] 21-30

## 2023-02-24 ENCOUNTER — ATHLETIC TRAINING SESSION (OUTPATIENT)
Dept: SPORTS MEDICINE | Facility: CLINIC | Age: 23
End: 2023-02-24
Payer: COMMERCIAL

## 2023-02-24 NOTE — PROGRESS NOTES
[facility]                      [facility]       Weekly Treatment Log       Name: Neftaly Cruz  [facility] Number: [id number]  Sport: Men's Baseball    ___________________________________________________________    Visit Date: 2023    Reason For Visit: Rehab  Affected Area: Right Shoulder    Chinmay completed the following exercises, treatments, and modalities.       Modality/Manual Therapy Time   Light Stretch 5 min             Exercise Reps/Sets/Time Weight #   Shoulder ISOs 3 x 10    Banded IR/ER 3 x 10    Prone IR 3 x 10                          Today's Throwin-30 throws at 80 feet - 50% intensity    Athlete reports the throwing went well. He reports he is starting to feel a bit fatigued, but not in pain. Plan to throw again tomorrow with light arm care and take Monday off from throwing. Will continue to progress.  ___________________________________________________________    Visit Date: 2023    Reason For Visit: Rehab  Affected Area: Right Shoulder    Chinmay completed the following exercises, treatments, and modalities.       Modality/Manual Therapy Time   Light Stretch 5 min             Exercise Reps/Sets/Time Weight #   Shoulder ISOs 3 x 10    Banded IR/ER 3 x 10    90/90 IR/ER Walkouts 3 x 8    Standing ITYs 3 x 10    Prone Ret w/ IR 3 x 10    Body Blade 3 x 30 sec                                           Today's Throwin-25 throws at 75 feet - 50% intensity    Athlete reports the throwing went well. There were a couple of throws that he felt a slight twinge, but it was less than a 1/10 and was inconsistent. He reports feeling good after throwing. Plan to continue throwing tomorrow and slightly ramp up or extend distance.  ___________________________________________________________    Visit Date: 2023    Reason For Visit: Rehab  Affected Area: Right Shoulder    Chinmay completed the following exercises, treatments, and modalities.        Modality/Manual Therapy Time   Light Stretch 5 min             Exercise Reps/Sets/Time Weight #   Shoulder ISOs 3 x 10    Banded IR/ER 3 x 10    90/90 IR/ER Walkouts 3 x 8    Standing ITYs 3 x 10    Prone Ret w/ IR 3 x 10    Body Blade 3 x 30 sec                                           Today's Throwin-25 throws at 60 feet - 50% intensity    Athlete reports the throwing went well. There were a couple of throws that he felt a slight twinge, but it was less than a 1/10 and was inconsistent. He reports feeling good after throwing. Plan to continue throwing tomorrow and slightly ramp up or extend distance.   ___________________________________________________________    Visit Date: 23    Reason For Visit: Rehab  Affected Area: Right Shoulder    Chinmay completed the following exercises, treatments, and modalities.    Modality/Manual Therapy Time                 Exercise Reps/Sets/Time Weight #   ISO Shoulders 3 x 10    Scap Retractions 3 x 10    No Monies 3 x 10    Banded IR/ER 3 x 10    ITYs 3 x 10    Prone Ret w/ IR 3 x 10    Body Blade 3 x 30 sec      Patient reports feeling good. He doesn't have any pain today and is eager to start throwing tomorrow.  ___________________________________________________________    Visit Date: 23    Reason For Visit: Rehab  Affected Area: Right Shoulder    Chinmay completed the following exercises, treatments, and modalities.    Modality/Manual Therapy Time                 Exercise Reps/Sets/Time Weight #   ISO Shoulders 3 x 10    Scap Retractions 3 x 10    No Monies 3 x 10                          Patient reports feeling fine. He did lower body in the weight room, and light rehab today after the injection. He reports feeling a little sore where he got the injection, but nothing too bad.         King Gill MAT, LAT, ATC, BRUNILDA    [employer] [facility] [place]

## 2023-02-28 ENCOUNTER — ATHLETIC TRAINING SESSION (OUTPATIENT)
Dept: SPORTS MEDICINE | Facility: CLINIC | Age: 23
End: 2023-02-28
Payer: COMMERCIAL

## 2023-02-28 NOTE — PROGRESS NOTES
[facility]                      [facility]       Weekly Treatment Log       Name: Neftaly Cruz  Clinic Number: [id number]  Sport: Men's Baseball    ___________________________________________________________    Visit Date: 3/3/2023    Reason For Visit: Rehab  Affected Area: Right Shoulder    Chinmay completed the following exercises, treatments, and modalities.       Modality/Manual Therapy Time   Light Stretch 5 min   E-Stim 15 min         Exercise Reps/Sets/Time Weight #   ER/IR Iso Walkouts 3 x 10    Wall [place] 3 x 10    Prone Ret w/ IR 3 x 10    Prone ITYs 3 x 10    No Monies 3 x 10                                                Today's Throwin-35 Throws at 100 feet - 75%+ intensity    Athlete reports feeling good today.   ___________________________________________________________    Visit Date: 3/2/2023    Reason For Visit: Rehab  Affected Area: Right Shoulder    Chinmay completed the following exercises, treatments, and modalities.       Modality/Manual Therapy Time   Light Stretch 5 min             Exercise Reps/Sets/Time Weight #   Shoulder ISOs 3 x 10    Banded IR/ER 3 x 10    Banded D1/D2 3 x 10    Standing ITYs 3 x 10    Prone Ret w/ IR 3 x 10    Body Blade 3 x 30 sec    No Monies 3 x 10                                      Today's Throwin-35 Throws at 90 feet - 75%+ intensity    Athlete reports feeling good today. Just feels a bit fatigued as a whole. Wants to throw his first bullpen this weekend.  ___________________________________________________________    Visit Date: 3/1/2023    Reason For Visit: Rehab  Affected Area: Right Shoulder    Chinmay completed the following exercises, treatments, and modalities.       Modality/Manual Therapy Time   Light Stretch 5 min   E-Stim 15 min         Exercise Reps/Sets/Time Weight #   Shoulder ISOs 3 x 10    Banded IR/ER 3 x 10    Banded D1/D2 3 x 10    Standing ITYs 3 x 10    Prone Ret w/ IR 3 x 10     Body Blade 3 x 30 sec    No Monies 3 x 10                                      Today's Throwin-35 Throws at 90 feet - 75%+ intensity    Athlete reports feeling good today. Just feels a bit fatigued as a whole. Wants to throw his first bullpen this weekend.  ___________________________________________________________    Visit Date: 2023    Reason For Visit: Rehab  Affected Area: Right Shoulder    Chinmay completed the following exercises, treatments, and modalities.       Modality/Manual Therapy Time   Light Stretch 5 min             Exercise Reps/Sets/Time Weight #   Shoulder ISOs 3 x 10    Banded IR/ER 3 x 10    Banded D1/D2 3 x 10    Standing ITYs 3 x 10    Prone Ret w/ IR 3 x 10    Body Blade 3 x 30 sec                                           No throwing today    Athlete reports feeling good.  ___________________________________________________________    Visit Date: 2023    Reason For Visit: Rehab  Affected Area: Right Shoulder    Chinmay completed the following exercises, treatments, and modalities.       Modality/Manual Therapy Time   Light Stretch 5 min             Exercise Reps/Sets/Time Weight #   Shoulder ISOs 3 x 10    Banded IR/ER 3 x 10    Banded D1/D2 3 x 10    Body Blade 3 x 10                Today's Throwin-35 throws at 90 feet - 50-65% intensity    Athlete reports the throwing felt really good, but his arm is pretty sore and fatigued from all of the work over the past 5 days. Plan to do rehab tomorrow and take a no throw day.      King MAGALLANES, LAT, ATC, BRUNILDA    [employer] [facility] [place]

## 2023-03-07 ENCOUNTER — ATHLETIC TRAINING SESSION (OUTPATIENT)
Dept: SPORTS MEDICINE | Facility: CLINIC | Age: 23
End: 2023-03-07
Payer: COMMERCIAL

## 2023-03-09 DIAGNOSIS — M25.811 SHOULDER IMPINGEMENT, RIGHT: ICD-10-CM

## 2023-03-09 DIAGNOSIS — M25.511 ACUTE PAIN OF RIGHT SHOULDER: Primary | ICD-10-CM

## 2023-03-09 DIAGNOSIS — M67.819 TENDINOSIS OF ROTATOR CUFF: ICD-10-CM

## 2023-03-15 ENCOUNTER — CLINICAL SUPPORT (OUTPATIENT)
Dept: REHABILITATION | Facility: HOSPITAL | Age: 23
End: 2023-03-15
Attending: ORTHOPAEDIC SURGERY
Payer: COMMERCIAL

## 2023-03-15 DIAGNOSIS — M25.511 ACUTE PAIN OF RIGHT SHOULDER: ICD-10-CM

## 2023-03-15 DIAGNOSIS — M62.81 MUSCLE WEAKNESS OF RIGHT UPPER EXTREMITY: ICD-10-CM

## 2023-03-15 DIAGNOSIS — M89.9 SCAPULAR DYSFUNCTION: ICD-10-CM

## 2023-03-15 DIAGNOSIS — M67.819 TENDINOSIS OF ROTATOR CUFF: ICD-10-CM

## 2023-03-15 DIAGNOSIS — M25.811 SHOULDER IMPINGEMENT, RIGHT: ICD-10-CM

## 2023-03-15 PROCEDURE — 97112 NEUROMUSCULAR REEDUCATION: CPT

## 2023-03-15 PROCEDURE — 97140 MANUAL THERAPY 1/> REGIONS: CPT

## 2023-03-15 PROCEDURE — 97161 PT EVAL LOW COMPLEX 20 MIN: CPT

## 2023-03-16 NOTE — PROGRESS NOTES
Physical therapy referral has been placed to work on shoulder, scapular strengthening and stabilizing.

## 2023-03-17 ENCOUNTER — ATHLETIC TRAINING SESSION (OUTPATIENT)
Dept: SPORTS MEDICINE | Facility: CLINIC | Age: 23
End: 2023-03-17
Payer: COMMERCIAL

## 2023-03-17 NOTE — PROGRESS NOTES
[facility] [place]                      [facility]       Weekly Treatment Log       Name: Neftaly Cruz  Clinic Number: [id number]  Sport: Men's Baseball    Visit Date: 3/17/2023    Reason For Visit: Rehab  Affected Area: Right Shoulder    Chinmay completed the following exercises, treatments, and modalities.     Modality/Manual Therapy Time   Stretch 10 min              Exercise Reps/Sets/Time Weight #   Active Lat Stretch 3 x 10    Body Blade 3 x 30 sec    Prone Ret w/ IR 3 x 10    Prone ER Ts 3 x10    Rock Backs with Ys 3 x 10                                               Patient reports feeling good. He's a little stiff from the bus ride, but otherwise is good. Likely won't throw this weekend, but will be ready if called on.  ___________________________________________________________    Visit Date: 3/15/2023    Reason For Visit: Rehab  Affected Area: Right Shoulder     Patient reports feeling decent. He went to PT and it went well. He threw live at bats after practice and the pain got up to about a 4 towards the end, but didn't feel worse than that.   ___________________________________________________________    Visit Date: 3/13/2023    Reason For Visit: Rehab  Affected Area: Right Shoulder    Chinmay completed the following exercises, treatments, and modalities.     Modality/Manual Therapy Time   Stretch 10 min   Fascial Cupping 10 min          Exercise Reps/Sets/Time Weight #   Active Lat Stretch 3 x 10    IR/ER Iso Walkouts 3 x 10    Prone Ret w/ IR 3 x 10    Prone ER Ts 3 x10    Rock Backs with Ys 3 x 10    Banded D1/D2 3 x 10    Wall [place] 3 x 10    Body Blade 3 x 30 sec                                Patient reports feeling okay today. Reports feeling okay when throwing, but he didn't want to ramp it up.  ___________________________________________________________    Visit Date: 3/12/2023    Reason For Visit: Rehab  Affected Area: Right Shoulder    Chinmay  completed the following exercises, treatments, and modalities.     Modality/Manual Therapy Time   Stretch 10 min              Exercise Reps/Sets/Time Weight #   Active Lat Stretch 3 x 10    IR/ER Iso Walkouts 3 x 10    Prone Ret w/ IR 3 x 10    Prone ER Ts 3 x10    Rock Backs with Ys 3 x 10    Banded D1/D2 3 x 10                                          Patient reports feeling okay today. Reports feeling pretty sore and tired, but overall in a positive direction. He likely won't throw today.      King MAGALLANES, LAT, ATC, BRUNILDA    [employer] [facility] [place]

## 2023-03-17 NOTE — PROGRESS NOTES
[facility] [place]                      [facility]       Weekly Treatment Log       Name: Neftaly Cruz  [facility] Number: [id number]  Sport: Men's Baseball    ___________________________________________________________    Visit Date: 3/11/2023    Reason For Visit: Rehab  Affected Area: Right Shoulder    Chinmay completed the following exercises, treatments, and modalities.     Modality/Manual Therapy Time   Stretch 5 min   E-Stim 10 min                           Exercise Reps/Sets/Time Weight #   Active Lat Stretch 3 x 10    IR/ER Iso Walkouts 3 x 10    Prone Ret w/ IR 3 x 10    Prone ER Ts 3 x 10    Post Throw Arm Care                                               Reports feeling decent. He reports that his bullpen went well, but towards the end he definitely started to feel it. He reports he might have gone too long with his throwing, but overall is positive.  ___________________________________________________________    Visit Date: 3/9/2023    Reason For Visit: Rehab  Affected Area: Right Shoulder    Chinmay completed the following exercises, treatments, and modalities.     Modality/Manual Therapy Time   Stretch 5 min   E-Stim 10 min   Fascial Cupping 10 min                       Exercise Reps/Sets/Time Weight #   IR/ER Iso Walkouts 3 x 10    Banded IR/ER 3 x 10    Prone ITYs 3 x 10    Prone Ret w/ IR 3 x 10    Wall [place] 3 x 10    Banded D1/D2 3 x 10    Banded ER/IR Rhythm Stab 3 x 30 sec    Body Blade 3 x 30 sec                               Feeling a little better today, but still having some pain. It isn't as bad, but he took it light today. He still wants to try and bullpen this weekend if possible.  ___________________________________________________________    Visit Date: 3/8/2023    Reason For Visit: Rehab  Affected Area: Right Shoulder    Chinmay completed the following exercises, treatments, and modalities.     Modality/Manual Therapy Time   Stretch 5 min    E-Stim 10 min                           Exercise Reps/Sets/Time Weight #   IR/ER Iso Walkouts 3 x 10    Banded IR/ER 3 x 10    Prone ITYs 3 x 10    Banded D1/D2 3 x 10    Banded ER/IR Rhythm Stab 3 x 30 sec    Body Blade 3 x 30 sec                                 Patient reports feeling not great today. He reports the pain was similar to yesterday.  ___________________________________________________________    Visit Date: 3/7/2023    Reason For Visit: Rehab  Affected Area: Right Shoulder    Chinmay completed the following exercises, treatments, and modalities.     Modality/Manual Therapy Time   Stretch 5 min   E-Stim 10 min                           Exercise Reps/Sets/Time Weight #   IR/ER Iso Walkouts 3 x 10    Banded IR/ER 3 x 10    Prone ITYs 3 x 10    Banded D1/D2 3 x 10    Banded ER/IR Rhythm Stab 3 x 30 sec    Body Blade 3 x 30 sec                                 Patient reports feeling not great today. He threw and he felt some pain in his shoulder. He reports it wasn't as bad as it has been, but it was enough to make him cut back on throwing and take it light.  ___________________________________________________________    Visit Date: 3/6/2023    Reason For Visit: Rehab  Affected Area: Right Shoulder    Chinmay completed the following exercises, treatments, and modalities.     Modality/Manual Therapy Time   Stretch 5 min   E-Stim 10 min                           Exercise Reps/Sets/Time Weight #   IR/ER Iso Walkouts 3 x 10    Banded IR/ER 3 x 10    Prone ITYs 3 x 10    Prone Ret w/ IR 3 x 10    Wall Walkers 3 x 10    Banded D1/D2 3 x 10    Banded ER/IR Rhythm Stab 3 x 30 sec    Body Blade 3 x 30 sec                                 Patient reports feeling really good today, no major issues. He reprots he isn't going to throw today.   ___________________________________________________________    Visit Date: 3/5/2023    Reason For Visit: Rehab  Affected Area: Right Shoulder    Chinmay completed the following  exercises, treatments, and modalities.       Modality/Manual Therapy Time   Stretch                      Exercise Reps/Sets/Time Weight #   Pre Throw Arm Care     Post Throw Arm Care                           Patient threw a bullpen today and reports feeling really good. He reports that overall it went very well, but he could tell his arm was not conditioned because by the end of it, he felt very tired. He reports no pain, just fatigue in the shoulder.      King MAGALLANES, LAT, ATC, BRUNILDA    [employer] [facility] [place]

## 2023-03-20 PROBLEM — M89.9 SCAPULAR DYSFUNCTION: Status: ACTIVE | Noted: 2023-03-20

## 2023-03-20 PROBLEM — M62.81 MUSCLE WEAKNESS OF RIGHT UPPER EXTREMITY: Status: ACTIVE | Noted: 2023-03-20

## 2023-03-20 PROBLEM — M25.511 ACUTE PAIN OF RIGHT SHOULDER: Status: ACTIVE | Noted: 2023-03-20

## 2023-03-20 PROBLEM — M67.819 TENDINOSIS OF ROTATOR CUFF: Status: ACTIVE | Noted: 2023-03-20

## 2023-03-20 PROBLEM — M25.811 SHOULDER IMPINGEMENT, RIGHT: Status: ACTIVE | Noted: 2023-03-20

## 2023-03-20 NOTE — PLAN OF CARE
[facility]  Physical Therapy Initial Evaluation    Name: [facility] Number: [id number]    Therapy Diagnosis:   Encounter Diagnoses   Name Primary?    Acute pain of right shoulder     Tendinosis of rotator cuff     Shoulder impingement, right     Scapular dysfunction     Muscle weakness of right upper extremity      Physician: Nolan Tejeda DO    Physician Orders: PT Eval and Treat  Medical Diagnosis from Referral:   M25.511 (ICD-10-CM) - Acute pain of right shoulder   M67.819 (ICD-10-CM) - Tendinosis of rotator cuff   M75.41 (ICD-10-CM) - Shoulder impingement, right     Evaluation Date: 3/15/2023  Authorization Period Expiration: 12/31/2023  Plan of Care Expiration: 12/31/2023  Visit # / Visits authorized: 1/ 1    Time In: 1100  Time Out: 1200  Total Billable Time: 60 minutes    Precautions: Standard    Subjective     Date of onset: 2nd week of January 2023  History of current condition - Chinmay reports: An insidious onset of anterior right shoulder pain with pitching and throwing that began during preseason training. 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 He is unable to recall any specific mechanism. Pain is described as a sharp pinch that occurs during late cocking and early acceleration phases of throwing. He reports he will occasionally get posterior shoulder soreness as well but his anterior shoulder pain is far more consistent. No referral of sxs proximal or distal including neck, elbow, or hand. Denies paresthesias as well. He has never had this before. He can throw up to 50-60% intensity but has 6/10 pain with anything harder that will increase if he increases his intensity. He has been working with his AT, [place] with minimal improvement. Treatment has included some stability based shoulder exercises, cupping, needling, e-stim, and various forms of soft tissue mobilization. He has been held from throwing anything more than light tossing for ~4 weeks.       Imaging     MRI ARTHROGRAM SHOULDER WITH CONTRAST RIGHT     CLINICAL HISTORY:  Shoulder pain, labral tear suspected, xray done;     TECHNIQUE:  MR arthrogram evaluation of the right shoulder performed following the intra-articular administration of contrast using the following sequences: Coronal T2 FS, T1 FS; sagittal PD FS, T1; axial T1 FS; Aber T1 FS.     COMPARISON:  Shoulder radiograph 01/26/2023.     FINDINGS:  ROTATOR CUFF: Infraspinatus tendinosis.  Mild fraying of the articular surface conjoined area of the infraspinatus and supraspinatus tendon.  The subscapularis, teres minor tendons are intact.     LABRUM: Mild signal undercuts the anterior superior labrum, extending slightly posterior to the biceps anchor, concerning for subtle SLAP tear.     BICEPS: Normal contour and signal intensity.     BONES: Subchondral cystic change with associated marrow edema underlies the infraspinatus tendon attachment, measuring up to 2.6 cm diameter (coronal T2 FS image 8).  No fractures.  No avascular necrosis.  No infiltrative process.     AC JOINT: Unremarkable.     CARTILAGE: Intact without partial or full-thickness defects.     MISCELLANEOUS: Subacromial/subdeltoid bursa intact.  Superior, middle and inferior glenohumeral ligaments are normal.  Coracohumeral ligament is normal.  No axillary lymphadenopathy.     Impression:     Constellation of findings concerning for posterior impingement including SLAP tear, mild fraying and tendinosis of the articular-surface conjoined area of the infraspinatus and supraspinatus tendon, and osseous cystic changes and edema of the posterolateral humeral head underlying the infraspinatus tendon attachment.    XR SHOULDER COMPLETE 2 OR MORE VIEWS RIGHT     CLINICAL HISTORY:  Pain in right shoulder     TECHNIQUE:  Two or three views of the right shoulder were performed.     COMPARISON:  None     FINDINGS:  No acute fracture or dislocation seen.  No soft tissue edema or radiopaque  retained foreign body.     Impression:     No acute osseous abnormality seen.    Prior Therapy: See above  Exercise Routine/Sport Participation: RH pitcher for [employer]  Social History: Lives at home  Occupation:   Prior Level of Function: Unrestricted  Current Level of Function: Pain and limitation with sport specific tasks associated with his scholarship allowing him to attend school    Pain:  Current 0/10, worst 7/10, best 0/10   Location: right anterior and posterior shoulder  Description: Aching, Dull, Grabbing, Tight, and Sharp  Aggravating Factors: See above  Easing Factors: ice and rest    Pts goals: Be able to pitch again this season      Medical History:   No past medical history on file.    Surgical History:   Neftaly Cruz  has no past surgical history on file.    Medications:   Neftaly has a current medication list which includes the following prescription(s): meloxicam.    Allergies:   Review of patient's allergies indicates:  No Known Allergies     Objective     Posture: Flattened thoracic kyphosis. R shoulder depressed and scapula downwardly rotated. Increased bilateral medial border and inferior angle prominence.       Shoulder Elevation: Delayed setting phase and upward rotation. Increased medial border prominence during lowering. Increased when placed under load      Shoulder Active Range of Motion:   Shoulder Right Left   Flexion   180 180   ER at 0   95 90   Behind the back Reach   T5 T8   Scapula Upward Rotation 50 60      Shoulder Passive Range of Motion:   Shoulder Right Left   Flexion   180 180   ER at 0   95 85   ER at 90   115 93   IR   46 60     Cervical Range of Motion:    % Observation Pain   Flexion 100 WNL N     Extension 100 WNL N     Right Rotation 90 Coupled with side bend and flexion N     Left Rotation 100 WNL N     Right Sidebend 100 WNL N     Left Sidebend 100 WNL N       Seated Thoracolumbar Range of Motion:    % Observation Pain   Flexion 100 Decreased at T5-T9  N   Extension 70 TL Hinge N   Right Rotation 65  N   Left Rotation 50  N       Strength:   Right Left   Scaption 4/5 4/5   Shoulder ER at side 4/5 4/5   Shoulder ER at 90-90 via HHD (lbs)     Shoulder IR at side  5/5 5/5   Shoulder IR 90-90 via HHD (lbs)     Serratus Anterior 3+/5 3+/5   Middle Trap 3/5 3/5   Lower Trap  3/5 3/5         Special Tests:   Right Left   Colvin- Oliver - -   Neer's - -   Full Can - -   Posterior/Internal Impingement Sign - -   Supine Impingement  - -      Right Left   Load & Shift - -   Sulcus Sign - -   Apprehension Test - -      Right Left   Drop Arm Test - -   ER Lag Sign - -   Bear Hug - -   Belly Press  - -      Right Left   Active Compression - -   Cross Body Adduction - -       Joint Mobility: Increased anterior glide of R GH joint in 90/90 position. Slight decrease in inferior glide. Hypomobility with PA assessment T5-T9    Palpation: Mild TTP at anterior R GH joint line    Flexibility:   Post Cuff: R - ; L -   Lat: R + ; L +   Pec Minor: R + ; L +        CMS Impairment/Limitation/Restriction for FOTO Shoulder Survey    Therapist reviewed FOTO scores for Neftaly Cruz on 3/15/2023.   FOTO documents entered into [address] - see Media section.    Limitation Score: See media section  Category: Other       Treatment     Treatment Time In: 1135  Treatment Time Out: 1200  Total Treatment time separate from Evaluation: 25 minutes      Chinmay received the following manual therapy techniques: were applied for 8 minutes, including:  Prone PA Grd III-IV T5-T9  Supine thoracic HVLA   C/T junction HVLA     Chinmay participated in neuromuscular re-education activities to improve: Coordination, Kinesthetic, Sense, Proprioception, Posture, and Motor Control for 17 minutes. The following activities were included:  SA Wall slide with LT lift 2x12  Quadruped thoracic rotation x12 ea.    90/90 supported ER/IR 2x12 ea.     Home Exercises and Patient Education Provided     Education provided:   -  "scapulohumeral mechanics and principles of proximal stability for distal control  - Prognosis, activity modification, goals for therapy, role of therapy for care, exercises/HEP    Written Home Exercises Provided: See treatment section.  Exercises were reviewed and Chinmay was able to demonstrate them prior to the end of the session.   Pt received a written copy of exercises to perform at home. Chinmay demonstrated good  understanding of the education provided.     See EMR under patient instructions for exercises given.     Assessment     Neftaly is a 22 y.o. male referred to outpatient Physical Therapy with a medical diagnosis for "  M25.511 (ICD-10-CM) - Acute pain of right shoulder   M67.819 (ICD-10-CM) - Tendinosis of rotator cuff   M75.41 (ICD-10-CM) - Shoulder impingement, right   He presents with right shoulder pain primarily localized to the anterior shoulder during late cocking and early acceleration while pitching. 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 Associated impairments include ROM/flexibility impairments, decreased motor control of the scapulohumeral and glenohumeral complexes, and strength impairments of the shoulder girdle.       Pt will benefit from skilled outpatient Physical Therapy to address the deficits stated above and in the chart below, provide pt/family education, and to maximize pt's level of independence. Pt prognosis is Good.     Plan of care discussed with patient: Yes  Pt's spiritual, cultural and educational needs considered and patient is agreeable to the plan of care and goals as stated below:       Anticipated Barriers for therapy: None      Medical Necessity is demonstrated by the following  History  Co-morbidities and personal factors that may impact the plan of care Co-morbidities:   None known    Personal Factors:   no deficits     low "   Examination  Body Structures and Functions, activity limitations and participation restrictions that may impact the plan of care Body Regions:   neck  upper extremities  trunk    Body Systems:    ROM  strength  motor control  motor learning    Participation Restrictions:   None    Activity limitations:   Learning and applying knowledge  No deficit    General Tasks and Commands  No deficit    Communication  No deficit    Mobility  lifting and carrying objects    Self care  No deficit    Domestic Life  No deficit    Interactions/Relationships  No deficit    Life Areas  Participation in sport on which his college scholarship is dependent    Community and Social Life  No deficit          moderate   Clinical Presentation stable and uncomplicated low   Decision Making/ Complexity Score: low     Goals:  Short Term Goals: 4 weeks  1. Pt will be compliant with HEP 50% of prescribed amount.   2. The pt to demo improvement in parascapular strength of 1 grade via MMT  3.  Pt will improve FOTO to meet or exceed MCID  4. Pt will demo improvement in timing/sequencing of SH mechanics during loaded FE with 3# DB  5. Pt will report ability to throw at RPE of 60% without c/o anterior shoulder pain.     Long Term Goals: 16 weeks   Pt will be compliant with % of prescribed amount.   Pt will improve shoulder girdle strength via HHD to 110% LSI and 70% ER/IR ratio  Pt will demo PSET of at least 42 reps indicating good shoulder girdle endurance   Pt will report ability to pitch 1 inning with NPRS <2/10 and without limitation  The pt will report full participation in ADLs and IADLs without restrictions related to R shoulder pain.     Plan   Plan of care Certification: 3/15/2023 to 12/31/2023.    Outpatient Physical Therapy 2 times weekly for 16 weeks to include the following interventions: Electrical Stimulation IFC/TENS, Manual Therapy, Moist Heat/ Ice, Neuromuscular Re-ed, Patient Education, Self Care, Therapeutic Activities,  Therapeutic Exercise, and Dry Needling .     Meliton Monreal, PT , DPT, SCS

## 2023-03-21 ENCOUNTER — CLINICAL SUPPORT (OUTPATIENT)
Dept: REHABILITATION | Facility: HOSPITAL | Age: 23
End: 2023-03-21
Payer: COMMERCIAL

## 2023-03-21 DIAGNOSIS — M89.9 SCAPULAR DYSFUNCTION: Primary | ICD-10-CM

## 2023-03-21 DIAGNOSIS — M62.81 MUSCLE WEAKNESS OF RIGHT UPPER EXTREMITY: ICD-10-CM

## 2023-03-21 PROCEDURE — 97112 NEUROMUSCULAR REEDUCATION: CPT

## 2023-03-21 PROCEDURE — 97140 MANUAL THERAPY 1/> REGIONS: CPT

## 2023-03-21 PROCEDURE — 97110 THERAPEUTIC EXERCISES: CPT

## 2023-03-21 NOTE — PROGRESS NOTES
"  Physical Therapy Daily Treatment Note     Name: Neftaly [facility] Number: [id number]    Therapy Diagnosis:   Encounter Diagnoses   Name Primary?    Scapular dysfunction Yes    Muscle weakness of right upper extremity      Physician: Nolan Tejeda DO    Visit Date: 3/21/2023  Physician Orders: PT Eval and Treat  Medical Diagnosis from Referral:   M25.511 (ICD-10-CM) - Acute pain of right shoulder   M67.819 (ICD-10-CM) - Tendinosis of rotator cuff   M75.41 (ICD-10-CM) - Shoulder impingement, right      Evaluation Date: 3/15/2023  Authorization Period Expiration: 12/31/2023  Plan of Care Expiration: 12/31/2023  Visit # / Visits authorized: 1/ 20 + Eval      Time In: 1100  Time Out: 1201  Total Billable Time: 61 minutes    Precautions: Standard    Subjective     Pt reports: I have been able to throw 60% pain free. I start to feel some tightness if I try to throw any harder.    He was compliant with home exercise program.      Objective     Daily Measurements: NT      Daily Treatment       Chinmay received therapeutic exercises to develop strength, endurance, ROM, and flexibility for 12 minutes including:  Robot Lat Drill on wall 2x10   Sleeper str x4 mins 2#    Chinmay received the following manual therapy techniques: were applied for 12 minutes, including:  Prone PA Grd III-IV T5-T9  Supine thoracic HVLA   C/T junction HVLA          Chinmay participated in neuromuscular re-education activities to improve: Coordination, Kinesthetic, Sense, Proprioception, Posture, and Motor Control for 37 minutes. The following activities were included:  Supine 90/90 Eccentric ER 2x12   Quadruped SA Rock Back with LT lift 2x10   UT re-ed on wall [id number]"   Quadruped thoracic rotation x12 ea.    90/90 supported ER/IR 2x12 ea.     Next appointment:  SA Wall slide with LT lift 2x12    Home Exercises and Patient Education Provided     Education provided:   - Reviewed/updated HEP    Written Home Exercises Provided: yes.  Exercises were " reviewed and Chinmay was able to demonstrate them prior to the end of the session.  Chinmay demonstrated good  understanding of the education provided.     See EMR under patient instructions for exercises given.     Assessment     Chinmay presents with reports of decreased irritability of sxs with throwing and improved endurance with SA re-education. Pt demo'd ability to self-correct compensatory patterns with ER/IR motor control training. Progressed thoracic extension to improve ability to move in a segmental manner as well as addition of LT lift off with SA rocking.     Chinmay Is progressing well towards his goals.     Pt will continue to benefit from skilled outpatient physical therapy to address the deficits listed in the problem list box on initial evaluation, provide pt/family education and to maximize pt's level of independence in the home and community environment. Pt prognosis is Good.     Pt's spiritual, cultural and educational needs considered and pt agreeable to plan of care and goals.    Anticipated barriers to physical therapy: None    Goals:  Short Term Goals: 4 weeks  1. Pt will be compliant with HEP 50% of prescribed amount.   2. The pt to demo improvement in parascapular strength of 1 grade via MMT  3.  Pt will improve FOTO to meet or exceed MCID  4. Pt will demo improvement in timing/sequencing of SH mechanics during loaded FE with 3# DB  5. Pt will report ability to throw at RPE of 60% without c/o anterior shoulder pain.      Long Term Goals: 16 weeks   Pt will be compliant with % of prescribed amount.   Pt will improve shoulder girdle strength via HHD to 110% LSI and 70% ER/IR ratio  Pt will demo PSET of at least 42 reps indicating good shoulder girdle endurance   Pt will report ability to pitch 1 inning with NPRS <2/10 and without limitation  The pt will report full participation in ADLs and IADLs without restrictions related to R shoulder pain.     Plan     Outpatient Physical Therapy 2 times  weekly for 16 weeks to include the following interventions: Electrical Stimulation IFC/TENS, Manual Therapy, Moist Heat/ Ice, Neuromuscular Re-ed, Patient Education, Self Care, Therapeutic Activities, Therapeutic Exercise, and Dry Needling .     Meliton Monreal, PT , DPT, SCS

## 2023-03-24 ENCOUNTER — ATHLETIC TRAINING SESSION (OUTPATIENT)
Dept: SPORTS MEDICINE | Facility: CLINIC | Age: 23
End: 2023-03-24
Payer: COMMERCIAL

## 2023-03-24 NOTE — PROGRESS NOTES
[facility]                      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 He reports it didn't feel as good as it did a few days ago and thinks the short rest is the main cause. He reports it is still not as bad as it has been, but he knows he needs to keep working.      Daily Treatment     Post throwing arm care routine  Fascial Cupping  Stretching  ___________________________________________________________    Visit Date: 3/22/2023    Reason For Visit: Rehab  Affected Area: Right Shoulder    Chinmay completed the following exercises, treatments, and modalities.     Warm-Up Reps/Sets/Time Weight #   Heat 5 min                            Exercise Reps/Sets/Time Weight #   Active Lat Stretch     Rock Backs w/ Y Raise 3 x 10    Prone ITYs 3 x 10    Prone Ret w/ IR 3 x 10    Banded Walk outs 3 x 10    Body Blade 3 x 30 sec                                         Patient reports that his arm feels good. Overall his whole body is sore from lifting heavy and throwing, but he feels nothing abnormal or significant to his shoulder.      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 This was his normal velocity prior to injury, so he is very happy about that.      Daily Treatment     Post throwing arm care routine    ___________________________________________________________    Visit Date:  3/20/2023    Reason For Visit: Rehab  Affected Area: Right Shoulder    Chinmay completed the following exercises, treatments, and modalities.     Warm-Up Reps/Sets/Time Weight #   Heat 10 min                          Modality/Manual Therapy Time   Fascial Cupping 8 min                 Exercise Reps/Sets/Time Weight #   Active Lat Stretch 3 x 10    Rock Backs w/ Y raise 3 x 10    Prone ITYs 3 x 10    Prone Ret w/ IR 3 x 10    Banded Walkouts 3 x 10    Wall [place] 3 x 10    Wall Walkers 3 x 10    Banded D1/D2 3 x 10    Body Blade 3 x 30 sec                          Patient reports feeling pretty good today. He didn't throw much over the weekend, but when he did, it felt good. Reports he did heavy legs today in the weight room and has PT tomorrow.      King MAGALLANES, LAT, ATC, BRUNILDA    [employer] [facility]

## 2023-03-27 ENCOUNTER — CLINICAL SUPPORT (OUTPATIENT)
Dept: REHABILITATION | Facility: HOSPITAL | Age: 23
End: 2023-03-27
Payer: COMMERCIAL

## 2023-03-27 DIAGNOSIS — M89.9 SCAPULAR DYSFUNCTION: Primary | ICD-10-CM

## 2023-03-27 DIAGNOSIS — M62.81 MUSCLE WEAKNESS OF RIGHT UPPER EXTREMITY: ICD-10-CM

## 2023-03-27 PROCEDURE — 97110 THERAPEUTIC EXERCISES: CPT

## 2023-03-27 PROCEDURE — 97112 NEUROMUSCULAR REEDUCATION: CPT

## 2023-03-27 NOTE — PROGRESS NOTES
"  Physical Therapy Daily Treatment Note     Name: [facility] Number: [id number]    Therapy Diagnosis:   Encounter Diagnoses   Name Primary?    Scapular dysfunction Yes    Muscle weakness of right upper extremity      Physician: Nolan Tejeda DO    Visit Date: 3/27/2023  Physician Orders: PT Eval and Treat  Medical Diagnosis from Referral:   M25.511 (ICD-10-CM) - Acute pain of right shoulder   M67.819 (ICD-10-CM) - Tendinosis of rotator cuff   M75.41 (ICD-10-CM) - Shoulder impingement, right      Evaluation Date: 3/15/2023  Authorization Period Expiration: 12/31/2023  Plan of Care Expiration: 12/31/2023  Visit # / Visits authorized: 2 / 20 + Eval      Time In: 1002  Time Out: 1100  Total Billable Time: 58 minutes    Precautions: Standard    Subjective     Pt reports: He was able to throw an inning completely pain free and noted improved velocity. He threw another inning the next day and noted pain at the end of the inning.     He was compliant with home exercise program.      Objective     Daily Measurements: NT      Daily Treatment       Chinmay received therapeutic exercises to develop strength, endurance, ROM, and flexibility for 10 minutes including:  Robot Lat Drill on wall 2x12  Sleeper str x4 mins 2#    Chinmay received the following manual therapy techniques: were applied for 5 minutes, including:  Supine thoracic HVLA   C/T junction HVLA          Chinmay participated in neuromuscular re-education activities to improve: Coordination, Kinesthetic, Sense, Proprioception, Posture, and Motor Control for 43 minutes. The following activities were included:  Prone subscap re-education 4x6  90/90 Step back to eccentric ER/IR OTB/BTB 3x10 ea.   Quadruped thoracic rotation x12 ea.   Long sitting segmental thoracic extension x15   SA Wall slide with LT lift 3x12  1/2 kneeling thoracic rotation with 3D strap 3x10     Next appointment:  UT re-ed on wall [id number]"     Home Exercises and Patient Education Provided "     Education provided:   - Reviewed/updated HEP    Written Home Exercises Provided: yes.  Exercises were reviewed and Chinmay was able to demonstrate them prior to the end of the session.  Chinmay demonstrated good  understanding of the education provided.     See EMR under patient instructions for exercises given.     Assessment     Chinmay presents today after throwing an inning in two games. Once with no pain and once with late onset of pain. This is a significant improvement from having moderate to severe pain with 60% RPE towel drill. Improved ability to generate GH rotation without compensatory motion noted today. Progressed SA and LW re-education today.     Chinmay Is progressing well towards his goals.     Pt will continue to benefit from skilled outpatient physical therapy to address the deficits listed in the problem list box on initial evaluation, provide pt/family education and to maximize pt's level of independence in the home and community environment. Pt prognosis is Good.     Pt's spiritual, cultural and educational needs considered and pt agreeable to plan of care and goals.    Anticipated barriers to physical therapy: None    Goals:  Short Term Goals: 4 weeks  1. Pt will be compliant with HEP 50% of prescribed amount.   2. The pt to demo improvement in parascapular strength of 1 grade via MMT  3.  Pt will improve FOTO to meet or exceed MCID  4. Pt will demo improvement in timing/sequencing of SH mechanics during loaded FE with 3# DB  5. Pt will report ability to throw at RPE of 60% without c/o anterior shoulder pain.      Long Term Goals: 16 weeks   Pt will be compliant with % of prescribed amount.   Pt will improve shoulder girdle strength via HHD to 110% LSI and 70% ER/IR ratio  Pt will demo PSET of at least 42 reps indicating good shoulder girdle endurance   Pt will report ability to pitch 1 inning with NPRS <2/10 and without limitation  The pt will report full participation in ADLs and IADLs  without restrictions related to R shoulder pain.     Plan     Outpatient Physical Therapy 2 times weekly for 16 weeks to include the following interventions: Electrical Stimulation IFC/TENS, Manual Therapy, Moist Heat/ Ice, Neuromuscular Re-ed, Patient Education, Self Care, Therapeutic Activities, Therapeutic Exercise, and Dry Needling .     Meliton Monreal, PT , DPT, SCS

## 2023-03-30 ENCOUNTER — CLINICAL SUPPORT (OUTPATIENT)
Dept: REHABILITATION | Facility: HOSPITAL | Age: 23
End: 2023-03-30
Payer: COMMERCIAL

## 2023-03-30 DIAGNOSIS — M89.9 SCAPULAR DYSFUNCTION: Primary | ICD-10-CM

## 2023-03-30 DIAGNOSIS — M62.81 MUSCLE WEAKNESS OF RIGHT UPPER EXTREMITY: ICD-10-CM

## 2023-03-30 PROCEDURE — 97110 THERAPEUTIC EXERCISES: CPT

## 2023-03-30 PROCEDURE — 97112 NEUROMUSCULAR REEDUCATION: CPT

## 2023-03-30 PROCEDURE — 97140 MANUAL THERAPY 1/> REGIONS: CPT

## 2023-03-30 NOTE — PROGRESS NOTES
"  Physical Therapy Daily Treatment Note     Name: [facility] Number: [id number]    Therapy Diagnosis:   Encounter Diagnoses   Name Primary?    Scapular dysfunction Yes    Muscle weakness of right upper extremity      Physician: Nolan Tejeda DO    Visit Date: 3/30/2023  Physician Orders: PT Eval and Treat  Medical Diagnosis from Referral:   M25.511 (ICD-10-CM) - Acute pain of right shoulder   M67.819 (ICD-10-CM) - Tendinosis of rotator cuff   M75.41 (ICD-10-CM) - Shoulder impingement, right      Evaluation Date: 3/15/2023  Authorization Period Expiration: 12/31/2023  Plan of Care Expiration: 12/31/2023  Visit # / Visits authorized: 3 / 20 + Eval      Time In: 1300  Time Out: 1355  Total Billable Time: 55 minutes    Precautions: Standard    Subjective     Pt reports: He was prepared to 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 His arm has felt great during practice and warm ups.     He was compliant with home exercise program.      Objective     Daily Measurements: NT      Daily Treatment       Chinmay received therapeutic exercises to develop strength, endurance, ROM, and flexibility for 10 minutes including:  Robot Lat Drill on wall 2x12  Sleeper str x4 mins 2#      Chinmay received the following manual therapy techniques: were applied for 8 minutes, including:  Supine thoracic HVLA   C/T junction HVLA   Re-assessment       Chinmay participated in neuromuscular re-education activities to improve: Coordination, Kinesthetic, Sense, Proprioception, Posture, and Motor Control for 37 minutes. The following activities were included:  Snow angels at wall w/RTB 2x12   SA Press w/posterior scapular tilt 3x12  Body Blade 90/90 ER position 4x to burn  Long sitting segmental thoracic extension 2x5  1/2 kneeling thoracic rotation w/3D strap w/90/90 RS by PT 3x5  UT re-ed on wall [id number]"       Next appointment:  Prone subscap re-education 4x6  90/90 Step back to eccentric ER/IR " OTB/BTB 3x10 ea.   SA Wall slide with LT lift 3x12    Home Exercises and Patient Education Provided     Education provided:   - Reviewed/updated HEP    Written Home Exercises Provided: yes.  Exercises were reviewed and Chinmay was able to demonstrate them prior to the end of the session.  Chinmay demonstrated good  understanding of the education provided.     See EMR under patient instructions for exercises given.     Assessment     Chinmay continues to present with low sxs irritability which continues to improve. Progressed stabilization drills at shoulder girdle. Chinmay demo's difficulty controlling trunk extension and rotation moments, so interventions were progressed to address this with verbal and tactile cues for correction.     Chinmay Is progressing well towards his goals.     Pt will continue to benefit from skilled outpatient physical therapy to address the deficits listed in the problem list box on initial evaluation, provide pt/family education and to maximize pt's level of independence in the home and community environment. Pt prognosis is Good.     Pt's spiritual, cultural and educational needs considered and pt agreeable to plan of care and goals.    Anticipated barriers to physical therapy: None    Goals:  Short Term Goals: 4 weeks  1. Pt will be compliant with HEP 50% of prescribed amount.   2. The pt to demo improvement in parascapular strength of 1 grade via MMT  3.  Pt will improve FOTO to meet or exceed MCID  4. Pt will demo improvement in timing/sequencing of SH mechanics during loaded FE with 3# DB  5. Pt will report ability to throw at RPE of 60% without c/o anterior shoulder pain.      Long Term Goals: 16 weeks   Pt will be compliant with % of prescribed amount.   Pt will improve shoulder girdle strength via HHD to 110% LSI and 70% ER/IR ratio  Pt will demo PSET of at least 42 reps indicating good shoulder girdle endurance   Pt will report ability to pitch 1 inning with NPRS <2/10 and without  limitation  The pt will report full participation in ADLs and IADLs without restrictions related to R shoulder pain.     Plan     Outpatient Physical Therapy 2 times weekly for 16 weeks to include the following interventions: Electrical Stimulation IFC/TENS, Manual Therapy, Moist Heat/ Ice, Neuromuscular Re-ed, Patient Education, Self Care, Therapeutic Activities, Therapeutic Exercise, and Dry Needling .     Meliton Monreal, PT , DPT, SCS

## 2023-04-01 ENCOUNTER — ATHLETIC TRAINING SESSION (OUTPATIENT)
Dept: SPORTS MEDICINE | Facility: CLINIC | Age: 23
End: 2023-04-01
Payer: COMMERCIAL

## 2023-04-03 ENCOUNTER — CLINICAL SUPPORT (OUTPATIENT)
Dept: REHABILITATION | Facility: HOSPITAL | Age: 23
End: 2023-04-03
Payer: COMMERCIAL

## 2023-04-03 DIAGNOSIS — M89.9 SCAPULAR DYSFUNCTION: Primary | ICD-10-CM

## 2023-04-03 DIAGNOSIS — M62.81 MUSCLE WEAKNESS OF RIGHT UPPER EXTREMITY: ICD-10-CM

## 2023-04-03 PROCEDURE — 97140 MANUAL THERAPY 1/> REGIONS: CPT

## 2023-04-03 PROCEDURE — 97110 THERAPEUTIC EXERCISES: CPT

## 2023-04-03 PROCEDURE — 97112 NEUROMUSCULAR REEDUCATION: CPT

## 2023-04-03 NOTE — PROGRESS NOTES
"  Physical Therapy Daily Treatment Note     Name: [facility] Number: [id number]    Therapy Diagnosis:   Encounter Diagnoses   Name Primary?    Scapular dysfunction Yes    Muscle weakness of right upper extremity      Physician: Nolan Tejeda DO    Visit Date: 4/3/2023  Physician Orders: PT Eval and Treat  Medical Diagnosis from Referral:   M25.511 (ICD-10-CM) - Acute pain of right shoulder   M67.819 (ICD-10-CM) - Tendinosis of rotator cuff   M75.41 (ICD-10-CM) - Shoulder impingement, right      Evaluation Date: 3/15/2023  Authorization Period Expiration: 12/31/2023  Plan of Care Expiration: 12/31/2023  Visit # / Visits authorized: 4 / 20 + Eval      Time In: 1000  Time Out: 1056  Total Billable Time: 56 minutes    Precautions: Standard    Subjective     Pt reports: He pitched this weekend and did great. Most pitches he had no pain. Occasionally he had mild anterior shoulder pain and only on a couple he had moderate pain.     He was compliant with home exercise program.      Objective     Daily Measurements: NT      Daily Treatment       Chinmay received therapeutic exercises to develop strength, endurance, ROM, and flexibility for 15 minutes including:  Robot Lat Drill on wall 2x12  Sleeper str x4 mins 2#  Quadruped thoracic rotation 2x10 ea.       Chinmay received the following manual therapy techniques: were applied for 8 minutes, including:  Supine thoracic HVLA   C/T junction HVLA   Re-assessment  Lat Hold/relax       Chinmay participated in neuromuscular re-education activities to improve: Coordination, Kinesthetic, Sense, Proprioception, Posture, and Motor Control for 33 minutes. The following activities were included:   SA Press w/posterior scapular tilt 3x12 15#  Body Blade 90/90 ER position 4x to burn  1/2 kneeling thoracic rotation w/3D strap w/90/90 RS by PT 3x5  UT re-ed on wall [id number]"   Prone subscap re-education 4x6 2#  SA Wall slide with LT lift 3x12  1/2 kneeling Upside down KB press 10# " 3x10     Not performed:  90/90 Step back to eccentric ER/IR OTB/BTB 3x10 ea.   [place] re[place] at wall w/RTB 2x12    Home Exercises and Patient Education Provided     Education provided:   - Reviewed/updated HEP    Written Home Exercises Provided: yes.  Exercises were reviewed and Chinmay was able to demonstrate them prior to the end of the session.  Chinmay demonstrated good  understanding of the education provided.     See EMR under patient instructions for exercises given.     Assessment     Chinmay demo's improved carry over of lat mobility but some mild restriction persists. This is a good indicator he is better able to maintain GH centration while pitching. Progressed subscap re-education and SA strengthening. All performed with no adverse response.     Chinmay Is progressing well towards his goals.     Pt will continue to benefit from skilled outpatient physical therapy to address the deficits listed in the problem list box on initial evaluation, provide pt/family education and to maximize pt's level of independence in the home and community environment. Pt prognosis is Good.     Pt's spiritual, cultural and educational needs considered and pt agreeable to plan of care and goals.    Anticipated barriers to physical therapy: None    Goals:  Short Term Goals: 4 weeks  1. Pt will be compliant with HEP 50% of prescribed amount.   2. The pt to demo improvement in parascapular strength of 1 grade via MMT  3.  Pt will improve FOTO to meet or exceed MCID  4. Pt will demo improvement in timing/sequencing of SH mechanics during loaded FE with 3# DB  5. Pt will report ability to throw at RPE of 60% without c/o anterior shoulder pain.      Long Term Goals: 16 weeks   Pt will be compliant with % of prescribed amount.   Pt will improve shoulder girdle strength via HHD to 110% LSI and 70% ER/IR ratio  Pt will demo PSET of at least 42 reps indicating good shoulder girdle endurance   Pt will report ability to pitch 1 inning with NPRS  <2/10 and without limitation  The pt will report full participation in ADLs and IADLs without restrictions related to R shoulder pain.     Plan     Outpatient Physical Therapy 2 times weekly for 16 weeks to include the following interventions: Electrical Stimulation IFC/TENS, Manual Therapy, Moist Heat/ Ice, Neuromuscular Re-ed, Patient Education, Self Care, Therapeutic Activities, Therapeutic Exercise, and Dry Needling .     Meliton Monreal, PT , DPT, SCS

## 2023-04-05 ENCOUNTER — CLINICAL SUPPORT (OUTPATIENT)
Dept: REHABILITATION | Facility: HOSPITAL | Age: 23
End: 2023-04-05
Payer: COMMERCIAL

## 2023-04-05 DIAGNOSIS — M89.9 SCAPULAR DYSFUNCTION: Primary | ICD-10-CM

## 2023-04-05 DIAGNOSIS — M62.81 MUSCLE WEAKNESS OF RIGHT UPPER EXTREMITY: ICD-10-CM

## 2023-04-05 PROCEDURE — 97110 THERAPEUTIC EXERCISES: CPT

## 2023-04-05 PROCEDURE — 97140 MANUAL THERAPY 1/> REGIONS: CPT

## 2023-04-05 PROCEDURE — 97112 NEUROMUSCULAR REEDUCATION: CPT

## 2023-04-05 NOTE — PROGRESS NOTES
Physical Therapy Daily Treatment Note     Name: [facility] Number: [id number]    Therapy Diagnosis:   Encounter Diagnoses   Name Primary?    Scapular dysfunction Yes    Muscle weakness of right upper extremity      Physician: Nolan Tejeda DO    Visit Date: 4/5/2023  Physician Orders: PT Eval and Treat  Medical Diagnosis from Referral:   M25.511 (ICD-10-CM) - Acute pain of right shoulder   M67.819 (ICD-10-CM) - Tendinosis of rotator cuff   M75.41 (ICD-10-CM) - Shoulder impingement, right      Evaluation Date: 3/15/2023  Authorization Period Expiration: 12/31/2023  Plan of Care Expiration: 12/31/2023  Visit # / Visits authorized: 5 / 20 + Eval      Time In: 1400  Time Out: 1500  Total Billable Time: 58 minutes    Precautions: Standard    Subjective     Pt reports: Feels about the same as last treatment 2 days ago.     He was compliant with home exercise program.      Objective     Daily Measurements: NT      Daily Treatment       Chinmay received therapeutic exercises to develop strength, endurance, ROM, and flexibility for 15 minutes including:  Robot Lat Drill on wall 2x12  Sleeper str x4 mins 2#  Quadruped thoracic rotation 2x10 ea.   Walking Lunge with upside Down KB Press hold 25 ft <-> 3x      Chinmay received the following manual therapy techniques: were applied for 8 minutes, including:  Supine thoracic HVLA   C/T junction HVLA   Re-assessment  Lat Hold/relax       Chinmay participated in neuromuscular re-education activities to improve: Coordination, Kinesthetic, Sense, Proprioception, Posture, and Motor Control for 35 minutes. The following activities were included:   Paloff Press to OH GTB 3x10ea.   Prone subscap re-education 3x10 2#  Split stance Body Blade D2 3x5 ea.   SA Landmine Press 45# 3x8 ea.   90/90 Step back to eccentric ER/IR OTB/BTB 3x10 ea.         Not performed:  Snow angels at wall w/RTB 2x12  SA Press w/posterior scapular tilt 3x12 15#  1/2 kneeling thoracic rotation w/3D strap  "w/90/90 RS by PT 3x5  UT re-ed on wall [id number]"   SA Wall slide with LT lift 3x12    Home Exercises and Patient Education Provided     Education provided:   - Reviewed/updated HEP    Written Home Exercises Provided: yes.  Exercises were reviewed and Chinmay was able to demonstrate them prior to the end of the session.  Chinmay demonstrated good  understanding of the education provided.     See EMR under patient instructions for exercises given.     Assessment     Intro'd shoulder stability training with anti-extension/rotation trunk re-education and progressed parascapular re-education including rhythmic stabilization with D2 body blade drill. Chinmay has been able to pitch with significant reduction in pain.     Chinmay Is progressing well towards his goals.     Pt will continue to benefit from skilled outpatient physical therapy to address the deficits listed in the problem list box on initial evaluation, provide pt/family education and to maximize pt's level of independence in the home and community environment. Pt prognosis is Good.     Pt's spiritual, cultural and educational needs considered and pt agreeable to plan of care and goals.    Anticipated barriers to physical therapy: None    Goals:  Short Term Goals: 4 weeks  1. Pt will be compliant with HEP 50% of prescribed amount. (Met)  2. The pt to demo improvement in parascapular strength of 1 grade via MMT  3.  Pt will improve FOTO to meet or exceed MCID  4. Pt will demo improvement in timing/sequencing of SH mechanics during loaded FE with 3# DB  5. Pt will report ability to throw at RPE of 60% without c/o anterior shoulder pain.      Long Term Goals: 16 weeks   Pt will be compliant with % of prescribed amount.   Pt will improve shoulder girdle strength via HHD to 110% LSI and 70% ER/IR ratio  Pt will demo PSET of at least 42 reps indicating good shoulder girdle endurance   Pt will report ability to pitch 1 inning with NPRS <2/10 and without limitation  The pt " will report full participation in ADLs and IADLs without restrictions related to R shoulder pain.     Plan     Outpatient Physical Therapy 2 times weekly for 16 weeks to include the following interventions: Electrical Stimulation IFC/TENS, Manual Therapy, Moist Heat/ Ice, Neuromuscular Re-ed, Patient Education, Self Care, Therapeutic Activities, Therapeutic Exercise, and Dry Needling .     Meliton Monreal, PT , DPT, SCS

## 2023-04-10 ENCOUNTER — CLINICAL SUPPORT (OUTPATIENT)
Dept: REHABILITATION | Facility: HOSPITAL | Age: 23
End: 2023-04-10
Payer: COMMERCIAL

## 2023-04-10 DIAGNOSIS — M89.9 SCAPULAR DYSFUNCTION: Primary | ICD-10-CM

## 2023-04-10 DIAGNOSIS — M62.81 MUSCLE WEAKNESS OF RIGHT UPPER EXTREMITY: ICD-10-CM

## 2023-04-10 PROCEDURE — 97110 THERAPEUTIC EXERCISES: CPT

## 2023-04-10 PROCEDURE — 97112 NEUROMUSCULAR REEDUCATION: CPT

## 2023-04-10 PROCEDURE — 97140 MANUAL THERAPY 1/> REGIONS: CPT

## 2023-04-10 NOTE — PROGRESS NOTES
Physical Therapy Daily Treatment Note     Name: [facility] Number: [id number]    Therapy Diagnosis:   Encounter Diagnoses   Name Primary?    Scapular dysfunction Yes    Muscle weakness of right upper extremity      Physician: Nolan Tejeda DO    Visit Date: 4/10/2023  Physician Orders: PT Eval and Treat  Medical Diagnosis from Referral:   M25.511 (ICD-10-CM) - Acute pain of right shoulder   M67.819 (ICD-10-CM) - Tendinosis of rotator cuff   M75.41 (ICD-10-CM) - Shoulder impingement, right      Evaluation Date: 3/15/2023  Authorization Period Expiration: 12/31/2023  Plan of Care Expiration: 12/31/2023  Visit # / Visits authorized: 6 / 20 + Eval      Time In: 1103  Time Out: 1203  Total Billable Time: 60 minutes    Precautions: Standard    Subjective     Pt reports: He feels he is 75% improved since beginning PT. He threw 2 innings last Thursday with no pain during. He had mild pain during warm up before his first inning because he had minimal time to get ready.     He was compliant with home exercise program.      Objective     Daily Measurements:      Hip Passive Range of Motion:   Right  Left    Flexion 105 105   Extension 5 10   Ext. Rotation 35 35   Int. Rotation 15 25         Joint Mobility: Decreased inferior and anterior FA accessory mobility      Flexibility:     Jay Test Right  Left    Iliopsoas + +   Rectus Femoris  - -          Daily Treatment       Chinmay received therapeutic exercises to develop strength, endurance, ROM, and flexibility for 15 minutes including:  Hip IR Rock Back drill x12  Quadruped thoracic rotation 2x10 ea.   1/2 kneeling dynamic hip flexor str with Ball chop 2x12 ea.       Not performed:  Robot Lat Drill on wall 2x12  Sleeper str x4 mins 2#      Chinmay received the following manual therapy techniques: were applied for 10 minutes, including:  FA Distraction HVLA  Prone Anterior hip PA Grd III-IV  Objective assessment with findings noted above       Chinmay participated  "in neuromuscular re-education activities to improve: Coordination, Kinesthetic, Sense, Proprioception, Posture, and Motor Control for 35 minutes. The following activities were included:   Concentric/eccentric subscap re-ed with PT 3x8   Snow angels at wall w/RTB 2x12  Split stance Body Blade D2 3x5 ea.   SA Landmine Press 45# 3x12 ea.   Walking lunge w/Plate Chops <-> 25 ft 3x 10#      Not performed:  SA Press w/posterior scapular tilt 3x12 15#  1/2 kneeling thoracic rotation w/3D strap w/90/90 RS by PT 3x5  UT re-ed on wall [id number]"   SA Wall slide with LT lift 3x12  Paloff Press to OH GTB 3x10ea.   Prone subscap re-education 3x10 2#  90/90 Step back to eccentric ER/IR OTB/BTB 3x10 ea.     Home Exercises and Patient Education Provided     Education provided:   - Reviewed/updated HEP    Written Home Exercises Provided: yes.  Exercises were reviewed and Chinmay was able to demonstrate them prior to the end of the session.  Chinmay demonstrated good  understanding of the education provided.     See EMR under patient instructions for exercises given.     Assessment     I assessed Chinmay's hip mobility due to importance of the role they play in pitching motion and found relevant impairments in hip IR/ER and extension mobility. This is driving compensatory lumbar extension and rotation during the pitching motion and resulting in increased stress at the shoulder. Interventions intro'd to address today's findings which were performed correctly with PT instruction. Chinmay beckett's improved ability to control for extension/rotation moments in his lumbar spine today.     Chinmay Is progressing well towards his goals.     Pt will continue to benefit from skilled outpatient physical therapy to address the deficits listed in the problem list box on initial evaluation, provide pt/family education and to maximize pt's level of independence in the home and community environment. Pt prognosis is Good.     Pt's spiritual, cultural and educational " needs considered and pt agreeable to plan of care and goals.    Anticipated barriers to physical therapy: None    Goals:  Short Term Goals: 4 weeks  1. Pt will be compliant with HEP 50% of prescribed amount. (Met)  2. The pt to demo improvement in parascapular strength of 1 grade via MMT  3.  Pt will improve FOTO to meet or exceed MCID  4. Pt will demo improvement in timing/sequencing of SH mechanics during loaded FE with 3# DB  5. Pt will report ability to throw at RPE of 60% without c/o anterior shoulder pain.      Long Term Goals: 16 weeks   Pt will be compliant with % of prescribed amount.   Pt will improve shoulder girdle strength via HHD to 110% LSI and 70% ER/IR ratio  Pt will demo PSET of at least 42 reps indicating good shoulder girdle endurance   Pt will report ability to pitch 1 inning with NPRS <2/10 and without limitation  The pt will report full participation in ADLs and IADLs without restrictions related to R shoulder pain.     Plan     Outpatient Physical Therapy 2 times weekly for 16 weeks to include the following interventions: Electrical Stimulation IFC/TENS, Manual Therapy, Moist Heat/ Ice, Neuromuscular Re-ed, Patient Education, Self Care, Therapeutic Activities, Therapeutic Exercise, and Dry Needling .     Meliton Monreal, PT , DPT, SCS

## 2023-04-12 ENCOUNTER — CLINICAL SUPPORT (OUTPATIENT)
Dept: REHABILITATION | Facility: HOSPITAL | Age: 23
End: 2023-04-12
Payer: COMMERCIAL

## 2023-04-12 DIAGNOSIS — M89.9 SCAPULAR DYSFUNCTION: Primary | ICD-10-CM

## 2023-04-12 DIAGNOSIS — M62.81 MUSCLE WEAKNESS OF RIGHT UPPER EXTREMITY: ICD-10-CM

## 2023-04-12 PROCEDURE — 97140 MANUAL THERAPY 1/> REGIONS: CPT

## 2023-04-12 PROCEDURE — 97110 THERAPEUTIC EXERCISES: CPT

## 2023-04-12 PROCEDURE — 97112 NEUROMUSCULAR REEDUCATION: CPT

## 2023-04-12 NOTE — PROGRESS NOTES
"  Physical Therapy Daily Treatment Note     Name: [facility] Number: [id number]    Therapy Diagnosis:   Encounter Diagnoses   Name Primary?    Scapular dysfunction Yes    Muscle weakness of right upper extremity      Physician: Nolan Tejeda DO    Visit Date: 4/12/2023  Physician Orders: PT Eval and Treat  Medical Diagnosis from Referral:   M25.511 (ICD-10-CM) - Acute pain of right shoulder   M67.819 (ICD-10-CM) - Tendinosis of rotator cuff   M75.41 (ICD-10-CM) - Shoulder impingement, right      Evaluation Date: 3/15/2023  Authorization Period Expiration: 12/31/2023  Plan of Care Expiration: 12/31/2023  Visit # / Visits authorized: 7 / 20 + Eval      Time In: 1300  Time Out: 1400  Total Billable Time: 54 minutes    Precautions: Standard    Subjective     Pt reports: He felt tight yesterday but he didn't have to throw. He is not sure if he could've thrown.     He was compliant with home exercise program.      Objective     Daily Measurements:    4/10/23:  Hip Passive Range of Motion:   Right  Left    Flexion 105 105   Extension 5 10   Ext. Rotation 35 35   Int. Rotation 15 25         Joint Mobility: Decreased inferior and anterior FA accessory mobility      Flexibility:     Jay Test Right  Left    Iliopsoas + +   Rectus Femoris  - -      4/12/23:        Strength:   Right Left   Middle Trap 3+/5 3+/5   Lower Trap 4/5 3+/5   Shoulder ER 90-90 (lbs) 35.2 35.5   Shoulder IR 90-90 (Lbs)  32.3 32.5        Daily Treatment       Chinmay received therapeutic exercises to develop strength, endurance, ROM, and flexibility for 15 minutes including:  Robot Lat Drill on wall 2x12  Post cuff str at wall 3x20"  90/90 ER 3# Cable 4x5        Not performed:  Hip IR Rock Back drill x12  Quadruped thoracic rotation 2x10 ea.   1/2 kneeling dynamic hip flexor str with Ball chop 2x12 ea.         Chinmay received the following manual therapy techniques: were applied for 15 minutes, including:  Objective assessment with findings " "noted above       Chinmay participated in neuromuscular re-education activities to improve: Coordination, Kinesthetic, Sense, Proprioception, Posture, and Motor Control for 24 minutes. The following activities were included:   Concentric/eccentric subscap re-ed with PT 3x8   SA Wall slide with LT lift 3x12 YTB Handcuff  Prone subscap re-education 3x10 2#        Not performed:  Snow angels at wall w/RTB 2x12  Split stance Body Blade D2 3x5 ea.   Walking lunge w/Plate Chops <-> 25 ft 3x 10#  1/2 kneeling thoracic rotation w/3D strap w/90/90 RS by PT 3x5  UT re-ed on wall [id number]"   90/90 Step back to eccentric ER/IR OTB/BTB 3x10 ea.     Home Exercises and Patient Education Provided     Education provided:   - Reviewed/updated HEP    Written Home Exercises Provided: yes.  Exercises were reviewed and Chinmay was able to demonstrate them prior to the end of the session.  Chinmay demonstrated good  understanding of the education provided.     See EMR under patient instructions for exercises given.     Assessment     Chinmay demo's improved MT and LT strength via MMT today. He continues to demo subscapularis weakness and relative ER/IR strength compared to uninvolved side. He was experiencing a mild posterior shoulder pinching with IR step outs that improved with subscap re-education. He will need to address these impairments for optimal outcomes and may have variable irritability until this is corrected.     Chinmay Is progressing well towards his goals.     Pt will continue to benefit from skilled outpatient physical therapy to address the deficits listed in the problem list box on initial evaluation, provide pt/family education and to maximize pt's level of independence in the home and community environment. Pt prognosis is Good.     Pt's spiritual, cultural and educational needs considered and pt agreeable to plan of care and goals.    Anticipated barriers to physical therapy: None    Goals:  Short Term Goals: 4 weeks  1. Pt will be " compliant with HEP 50% of prescribed amount. (Met)  2. The pt to demo improvement in parascapular strength of 1 grade via MMT  3.  Pt will improve FOTO to meet or exceed MCID  4. Pt will demo improvement in timing/sequencing of SH mechanics during loaded FE with 3# DB  5. Pt will report ability to throw at RPE of 60% without c/o anterior shoulder pain.      Long Term Goals: 16 weeks   Pt will be compliant with % of prescribed amount.   Pt will improve shoulder girdle strength via HHD to 110% LSI and 70% ER/IR ratio  Pt will demo PSET of at least 42 reps indicating good shoulder girdle endurance   Pt will report ability to pitch 1 inning with NPRS <2/10 and without limitation  The pt will report full participation in ADLs and IADLs without restrictions related to R shoulder pain.     Plan     Outpatient Physical Therapy 2 times weekly for 16 weeks to include the following interventions: Electrical Stimulation IFC/TENS, Manual Therapy, Moist Heat/ Ice, Neuromuscular Re-ed, Patient Education, Self Care, Therapeutic Activities, Therapeutic Exercise, and Dry Needling .     Meliton Monreal, PT , DPT, SCS

## 2023-04-14 ENCOUNTER — ATHLETIC TRAINING SESSION (OUTPATIENT)
Dept: SPORTS MEDICINE | Facility: CLINIC | Age: 23
End: 2023-04-14

## 2023-04-14 NOTE — PROGRESS NOTES
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 He got hot and threw in the game today and didn't have his best outing. All things considered about getting hot two days in a row, he feels good that he was able to go out and pitch.  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 He got hot in the bullpen again for the second day in a row but felt the same as the day before. He reports he doesn't feel better or worse which is positive because he usually feels worse the day after he gets hot.    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 He got hot in the bullpen, but did not go into the game and felt okay.    __________________________________________________________    Visit Date: 4/13/2023    Reason For Visit: Rehab  Affected Area: Right Shoulder    Chinmay completed the following exercises, treatments, and modalities.     Modality/Manual Therapy Time   Stretching 10 min   Fascial Cupping          Patient reports feeling pretty good. He threw at practice after the long bus ride and he felt no pain until his last 5 throws or so. He reports it is probably the best his arm has felt in months.       AILYN Damon, ATC, BRUNILDA    [employer] [facility] [place] [facility]

## 2023-04-14 NOTE — PROGRESS NOTES
[facility]                      [facility]       Weekly Treatment Log       Name: Neftaly Cruz  [facility] Number: [id number]  Sport: Men's Baseball    ___________________________________________________________    Visit Date: 4/6/2023    Reason For Visit: Rehab  Affected Area: Right Shoulder    Chinmay completed the following exercises, treatments, and modalities.     Modality/Manual Therapy Time   Stretching 10 min             Exercise Reps/Sets/Time Weight #   Pre Throw Warm Up     Pot Throw Routine                 Patient reports feeling great today. He threw multiple innings in a game for the first time, but he had a very long break in between innings. He reports the second inning was harder likely due to the long break and he got very fatigued at the end of the inning, but otherwise he had little to no pain while throwing.  ___________________________________________________________    Visit Date: 4/4/2023    Reason For Visit: Rehab  Affected Area: Right Shoulder    Chinmay completed the following exercises, treatments, and modalities.     Modality/Manual Therapy Time   Stretching 10 min             Exercise Reps/Sets/Time Weight #   Pre Throw Warm Up                      Patient reports feeling not great today. For whatever reason, his arm just doesn't feel very good.  ___________________________________________________________    Visit Date: 4/1/2023    Reason For Visit: Rehab  Affected Area: Right Shoulder    Chinmay completed the following exercises, treatments, and modalities.     Modality/Manual Therapy Time   Stretching 10 min   Fascial Cupping          Patient reports feeling good today. He reports its the first time since he started throwing that he thinks he could pitch back to back days. He did not throw today due to the development of the game, but he reports he feels like he could  have.  ___________________________________________________________    Visit Date: 3/31/2023    Reason For Visit: Rehab  Affected Area: Right Shoulder    Chinmay completed the following exercises, treatments, and modalities.     Modality/Manual Therapy Time   Stretching 10 min             Exercise Reps/Sets/Time Weight #   Pre Throw Warm Up     Post Throw Routine                 Patient reports feeling really good today. He threw without an issue, but after he did report fatigue.    ___________________________________________________________    Visit Date: 3/29/2023    Reason For Visit: Rehab  Affected Area: Right Shoulder    Chinmay completed the following exercises, treatments, and modalities.     Modality/Manual Therapy Time   Stretching 10 min             Exercise Reps/Sets/Time Weight #   Pre Throw Warm Up                      Patient reports feeling really good today. He reports for the first time since his injury, he felt worse when he first started throwing and as he progressed out, he felt better and better and almost had no pain at the end. Patient did not throw today due to the score margin of the game, but felt like he would've done well if he had.    ___________________________________________________________    Visit Date: 3/28/2023    Reason For Visit: Rehab  Affected Area: Right Shoulder    Chinmay completed the following exercises, treatments, and modalities.     Modality/Manual Therapy Time   Stretching 10 min   Fascial Cupping 8 min         Exercise Reps/Sets/Time Weight #   Prone Ret w/ IR/ER 3 x 10    Banded ITYs 3 x 10    No Monies 3 x 10    Serratus Rockbacks 3 x 10    Prone Ts 3 x 10    Wall [place] 3 x 10    Banded IR/ER Walk outs 3 x 10    Banded D1/D2 3 x 10    Body Blade 3 x 30 sec                            Patient reports feeling decent today. He didn't throw yesterday, and just played catch today and overall it felt pretty good. Plan to continue rehab for  pitching.  ___________________________________________________________    Visit Date: 3/26/2023    Reason For Visit: Rehab  Affected Area: Right Shoulder    Chinmay completed the following exercises, treatments, and modalities.     Modality/Manual Therapy Time   Stretching 10 min             Exercise Reps/Sets/Time Weight #   Prone Ret w/ IR/ER 3 x 10    Banded ITYs 3 x 10    No Monies 3 x 10    Serratus Rockbacks 3 x 10                                                     Patient reports feeling pretty sore today. He reports the doesn't feel in a ton of pain, but is very sore and tight from his outing on Friday still.         King MAGALLANES, LAT, ATC, BRUNILDA    [employer] [facility] [employer]

## 2023-04-17 ENCOUNTER — CLINICAL SUPPORT (OUTPATIENT)
Dept: REHABILITATION | Facility: HOSPITAL | Age: 23
End: 2023-04-17
Payer: COMMERCIAL

## 2023-04-17 DIAGNOSIS — M89.9 SCAPULAR DYSFUNCTION: Primary | ICD-10-CM

## 2023-04-17 DIAGNOSIS — M62.81 MUSCLE WEAKNESS OF RIGHT UPPER EXTREMITY: ICD-10-CM

## 2023-04-17 PROCEDURE — 97110 THERAPEUTIC EXERCISES: CPT

## 2023-04-17 PROCEDURE — 97140 MANUAL THERAPY 1/> REGIONS: CPT

## 2023-04-17 PROCEDURE — 97112 NEUROMUSCULAR REEDUCATION: CPT

## 2023-04-17 NOTE — PROGRESS NOTES
"  Physical Therapy Daily Treatment Note     Name: [facility] Number: [id number]    Therapy Diagnosis:   Encounter Diagnoses   Name Primary?    Scapular dysfunction Yes    Muscle weakness of right upper extremity      Physician: Nolan Tejeda DO    Visit Date: 4/17/2023  Physician Orders: PT Eval and Treat  Medical Diagnosis from Referral:   M25.511 (ICD-10-CM) - Acute pain of right shoulder   M67.819 (ICD-10-CM) - Tendinosis of rotator cuff   M75.41 (ICD-10-CM) - Shoulder impingement, right      Evaluation Date: 3/15/2023  Authorization Period Expiration: 12/31/2023  Plan of Care Expiration: 12/31/2023  Visit # / Visits authorized: 8 / 20 + Eval      Time In: 1058  Time Out: 1154  Total Billable Time: 53 minutes    Precautions: Standard    Subjective     Pt reports: He only pitched once this weekend. He is now having some posterior shoulder pain during ball release and follow through that is worse when throwing his slider. He is not having much anterior shoulder pain if any.     He was compliant with home exercise program.      Objective     Daily Measurements:    4/10/23:  Hip Passive Range of Motion:   Right  Left    Flexion 105 105   Extension 5 10   Ext. Rotation 35 35   Int. Rotation 15 25         Joint Mobility: Decreased inferior and anterior FA accessory mobility      Flexibility:     Jay Test Right  Left    Iliopsoas + +   Rectus Femoris  - -      4/12/23:    Strength:   Right Left   Middle Trap 3+/5 3+/5   Lower Trap 4/5 3+/5   Shoulder ER 90-90 (lbs) 35.2 35.5   Shoulder IR 90-90 (Lbs)  32.3 32.5        Daily Treatment       Chinmay received therapeutic exercises to develop strength, endurance, ROM, and flexibility for 25 minutes including:  Robot Lat Drill on wall 2x12  Hip IR Rock Back drill x12  Quadruped thoracic rotation 2x10 ea.   1/2 kneeling dynamic hip flexor str with Ball chop 3x12 ea.       Not performed:  Post cuff str at wall 3x20"  90/90 ER 3# Cable 4x5        Chinmay received " the following manual therapy techniques: were applied for 08 minutes, including:  Hold/relax lat   Re-assessment       Chinmay participated in neuromuscular re-education activities to improve: Coordination, Kinesthetic, Sense, Proprioception, Posture, and Motor Control for 20 minutes. The following activities were included:   SL RDL with D2 Cable 7# 3x12  1/2 kneeling eccentric ball flip 3x12       Not performed:  Snow angels at wall w/RTB 2x12  Split stance Body Blade D2 3x5 ea.   1/2 kneeling thoracic rotation w/3D strap w/90/90 RS by PT 3x5  Concentric/eccentric subscap re-ed with PT 3x8   SA Wall slide with LT lift 3x12 YTB Handcuff  Prone subscap re-education 3x10 2#    Home Exercises and Patient Education Provided     Education provided:   - Reviewed/updated HEP    Written Home Exercises Provided: yes.  Exercises were reviewed and Chinmay was able to demonstrate them prior to the end of the session.  Chinmay demonstrated good  understanding of the education provided.     See EMR under patient instructions for exercises given.     Assessment     Chinmay's initial c/o anterior shoulder pain during late cocking and early acceleration has improved significantly and is now having some pain at ball release and follow through due to distraction forces at the shoulder. This is a positive development because his ability to throw during lay back has improved allowing him to generate more velocity and throw harder. Modified interventions to improve ability to attenuate these distraction forces.     Chinmay Is progressing well towards his goals.     Pt will continue to benefit from skilled outpatient physical therapy to address the deficits listed in the problem list box on initial evaluation, provide pt/family education and to maximize pt's level of independence in the home and community environment. Pt prognosis is Good.     Pt's spiritual, cultural and educational needs considered and pt agreeable to plan of care and  goals.    Anticipated barriers to physical therapy: None    Goals:  Short Term Goals: 4 weeks  1. Pt will be compliant with HEP 50% of prescribed amount. (Met)  2. The pt to demo improvement in parascapular strength of 1 grade via MMT (Met)  3.  Pt will improve FOTO to meet or exceed MCID (Met)  4. Pt will demo improvement in timing/sequencing of SH mechanics during loaded FE with 3# DB (Met)  5. Pt will report ability to throw at RPE of 60% without c/o anterior shoulder pain. (Met)     Long Term Goals: 16 weeks   Pt will be compliant with % of prescribed amount.   Pt will improve shoulder girdle strength via HHD to 110% LSI and 70% ER/IR ratio  Pt will demo PSET of at least 42 reps indicating good shoulder girdle endurance   Pt will report ability to pitch 1 inning with NPRS <2/10 and without limitation  The pt will report full participation in ADLs and IADLs without restrictions related to R shoulder pain.     Plan     Outpatient Physical Therapy 2 times weekly for 16 weeks to include the following interventions: Electrical Stimulation IFC/TENS, Manual Therapy, Moist Heat/ Ice, Neuromuscular Re-ed, Patient Education, Self Care, Therapeutic Activities, Therapeutic Exercise, and Dry Needling .     Meliton Monreal, PT , DPT, SCS

## 2023-04-19 ENCOUNTER — CLINICAL SUPPORT (OUTPATIENT)
Dept: REHABILITATION | Facility: HOSPITAL | Age: 23
End: 2023-04-19
Payer: COMMERCIAL

## 2023-04-19 DIAGNOSIS — M62.81 MUSCLE WEAKNESS OF RIGHT UPPER EXTREMITY: ICD-10-CM

## 2023-04-19 DIAGNOSIS — M89.9 SCAPULAR DYSFUNCTION: Primary | ICD-10-CM

## 2023-04-19 PROCEDURE — 97112 NEUROMUSCULAR REEDUCATION: CPT

## 2023-04-19 PROCEDURE — 97140 MANUAL THERAPY 1/> REGIONS: CPT

## 2023-04-19 PROCEDURE — 97110 THERAPEUTIC EXERCISES: CPT

## 2023-04-19 NOTE — PROGRESS NOTES
"  Physical Therapy Daily Treatment Note     Name: [facility] Number: [id number]    Therapy Diagnosis:   Encounter Diagnoses   Name Primary?    Scapular dysfunction Yes    Muscle weakness of right upper extremity      Physician: Nolan Tejeda DO    Visit Date: 4/19/2023  Physician Orders: PT Eval and Treat  Medical Diagnosis from Referral:   M25.511 (ICD-10-CM) - Acute pain of right shoulder   M67.819 (ICD-10-CM) - Tendinosis of rotator cuff   M75.41 (ICD-10-CM) - Shoulder impingement, right      Evaluation Date: 3/15/2023  Authorization Period Expiration: 12/31/2023  Plan of Care Expiration: 12/31/2023  Visit # / Visits authorized: 9 / 20 + Eval      Time In: 1400  Time Out: 1458  Total Billable Time: 58 minutes    Precautions: Standard    Subjective     Pt reports: He tried throwing yesterday and had a return of the anterior shoulder pain during cocking and acceleration. He feels this is because of how much he has thrown both in bullpens and games in the last 7-8 days.     He was compliant with home exercise program.      Objective     Daily Measurements:    4/10/23:  Hip Passive Range of Motion:   Right  Left    Flexion 105 105   Extension 5 10   Ext. Rotation 35 35   Int. Rotation 15 25         Joint Mobility: Decreased inferior and anterior FA accessory mobility      Flexibility:     Jay Test Right  Left    Iliopsoas + +   Rectus Femoris  - -      4/12/23:    Strength:   Right Left   Middle Trap 3+/5 3+/5   Lower Trap 4/5 3+/5   Shoulder ER 90-90 (lbs) 35.2 35.5   Shoulder IR 90-90 (Lbs)  32.3 32.5        Daily Treatment       Chinmay received therapeutic exercises to develop strength, endurance, ROM, and flexibility for 18 minutes including:  Robot Lat Drill on wall 2x12  Quadruped thoracic rotation 2x10 ea.   1/2 kneeling dynamic hip flexor str with Ball chop 3x12 ea.     Not performed:  Post cuff str at wall 3x20"  90/90 ER 3# Cable 4x5  Hip IR Rock Back drill x12      Chinmay received the " following manual therapy techniques: were applied for 10 minutes, including:  Hold/relax lat   IR LLLD Manual stretch x3 mins  Re-assessment       Chinmay participated in neuromuscular re-education activities to improve: Coordination, Kinesthetic, Sense, Proprioception, Posture, and Motor Control for 30 minutes. The following activities were included:   Concentric/eccentric subscap re-ed with PT 3x8   1/2 kneeling thoracic rotation w/3D strap w/90/90 Body blade 3x10  SL Bridge with SA Press 15# KB 3x12  SA Cable Press to Assisted posterior tilt 10# 3x12       Not performed:  Snow angels at wall w/RTB 2x12  SA Wall slide with LT lift 3x12 YTB Handcuff  Prone subscap re-education 3x10 2#  SL RDL with D2 Cable 7# 3x12  1/2 kneeling eccentric ball flip 3x12     Home Exercises and Patient Education Provided     Education provided:   - Reviewed/updated HEP    Written Home Exercises Provided: yes.  Exercises were reviewed and Chinmay was able to demonstrate them prior to the end of the session.  Chinmay demonstrated good  understanding of the education provided.     See EMR under patient instructions for exercises given.     Assessment     Chinmay's presents with c/o increased anterior shoulder pain with throwing likely secondary to increased volume of throwing the last week. He responded well to interventions emphasizing serratus anterior motor control and thoracic rotation to improve SH mechanics and GH centering with subjective 50% decreased in pain with towel drill at end of treatment.     Chinmay Is progressing well towards his goals.     Pt will continue to benefit from skilled outpatient physical therapy to address the deficits listed in the problem list box on initial evaluation, provide pt/family education and to maximize pt's level of independence in the home and community environment. Pt prognosis is Good.     Pt's spiritual, cultural and educational needs considered and pt agreeable to plan of care and goals.    Anticipated  barriers to physical therapy: None    Goals:  Short Term Goals: 4 weeks  1. Pt will be compliant with HEP 50% of prescribed amount. (Met)  2. The pt to demo improvement in parascapular strength of 1 grade via MMT (Met)  3.  Pt will improve FOTO to meet or exceed MCID (Met)  4. Pt will demo improvement in timing/sequencing of SH mechanics during loaded FE with 3# DB (Met)  5. Pt will report ability to throw at RPE of 60% without c/o anterior shoulder pain. (Met)     Long Term Goals: 16 weeks   Pt will be compliant with % of prescribed amount.   Pt will improve shoulder girdle strength via HHD to 110% LSI and 70% ER/IR ratio  Pt will demo PSET of at least 42 reps indicating good shoulder girdle endurance   Pt will report ability to pitch 1 inning with NPRS <2/10 and without limitation  The pt will report full participation in ADLs and IADLs without restrictions related to R shoulder pain.     Plan     Outpatient Physical Therapy 2 times weekly for 16 weeks to include the following interventions: Electrical Stimulation IFC/TENS, Manual Therapy, Moist Heat/ Ice, Neuromuscular Re-ed, Patient Education, Self Care, Therapeutic Activities, Therapeutic Exercise, and Dry Needling .     Meliton Monreal, PT , DPT, SCS

## 2023-04-24 ENCOUNTER — CLINICAL SUPPORT (OUTPATIENT)
Dept: REHABILITATION | Facility: HOSPITAL | Age: 23
End: 2023-04-24
Payer: COMMERCIAL

## 2023-04-24 DIAGNOSIS — M62.81 MUSCLE WEAKNESS OF RIGHT UPPER EXTREMITY: ICD-10-CM

## 2023-04-24 DIAGNOSIS — M89.9 SCAPULAR DYSFUNCTION: Primary | ICD-10-CM

## 2023-04-24 PROCEDURE — 97140 MANUAL THERAPY 1/> REGIONS: CPT

## 2023-04-24 PROCEDURE — 97110 THERAPEUTIC EXERCISES: CPT

## 2023-04-24 PROCEDURE — 97112 NEUROMUSCULAR REEDUCATION: CPT

## 2023-04-24 NOTE — PROGRESS NOTES
"  Physical Therapy Daily Treatment Note     Name: [facility] Number: [id number]    Therapy Diagnosis:   Encounter Diagnoses   Name Primary?    Scapular dysfunction Yes    Muscle weakness of right upper extremity      Physician: Nolan Tejeda DO    Visit Date: 4/24/2023  Physician Orders: PT Eval and Treat  Medical Diagnosis from Referral:   M25.511 (ICD-10-CM) - Acute pain of right shoulder   M67.819 (ICD-10-CM) - Tendinosis of rotator cuff   M75.41 (ICD-10-CM) - Shoulder impingement, right      Evaluation Date: 3/15/2023  Authorization Period Expiration: 12/31/2023  Plan of Care Expiration: 12/31/2023  Visit # / Visits authorized: 10 / 20 + Eval      Time In: 1115 (Pt arrived late)  Time Out: 1200  Total Billable Time: 45 minutes    Precautions: Standard    Subjective     Pt reports: He has not pitched since his last appointment. He did warm up in the bullpen and his shoulder felt better.     He was compliant with home exercise program.      Objective     Daily Measurements:    4/10/23:  Hip Passive Range of Motion:   Right  Left    Flexion 105 105   Extension 5 10   Ext. Rotation 35 35   Int. Rotation 15 25         Joint Mobility: Decreased inferior and anterior FA accessory mobility      Flexibility:     Jay Test Right  Left    Iliopsoas + +   Rectus Femoris  - -      4/12/23:    Strength:   Right Left   Middle Trap 3+/5 3+/5   Lower Trap 4/5 3+/5   Shoulder ER 90-90 (lbs) 35.2 35.5   Shoulder IR 90-90 (Lbs)  32.3 32.5        Daily Treatment       Chinmay received therapeutic exercises to develop strength, endurance, ROM, and flexibility for 17 minutes including:  Robot Lat Drill on wall 2x12  S/L D2 Eccentric 4# 3x12   Walking Lunge with 10# plate counter rotation <-> 15 ft 3x      Not performed:  Post cuff str at wall 3x20"  90/90 ER 3# Cable 4x5  Hip IR Rock Back drill x12  1/2 kneeling dynamic hip flexor str with Ball chop 3x12 ea.   Quadruped thoracic rotation 2x10 ea.       Chinmay received the " Dr. Vasquez is recommending Fractionated Coconut oil for dry skin    Actinic Keratoses, also known as solar keratoses, is a precancerous lesion of the epidermis (outer layer of the skin) that is caused by long-term exposure to sunlight.  Sun damaged causes liz areas of your skin to become scaly, rough, discolored and sometimes tender to the touch.  Aks are most commonly found on sun-exposed areas such as the face, lips, ears, neck, scalp, forearms and backs of hands.    People who have fair skin and light-colored hair and eyes are at the greatest risk of developing AKs.  People that are immunosuppressed, either by cancer chemotherapy treatment or organ transplant or  Who have an immunodeficiency disorder are also considered high risk for developing AKs.     If left untreated, aggressive AKs have the potential to progress into squamous cell carcinoma.         Treatment options    Liquid nitrogen is a cold, liquefied gas with a temperature of 196 degrees below zero Celsius (-321 degrees Fahrenheit).  It is used to freeze and destroy superficial skin growths such as warts and keratoses.    Topical Chemotherapy Creams or gels such as Effudex (Fluorouracil),Zyclara or Aldara (Imiquimod), or Solaraze.      Electrocautery and Curettage involves anesthetizing the growth and using an electric current to cauterize (burn) the growth.  A curette is then used to scrape off the treated lesion. Sutures are not required.         Seborrheic Keratosis are common skin growths. These benign (non-cancerous) growths can occur almost anywhere on the skin.  Some people get just one; others develop many.  Having many seborrheic keratosis is more common.    Usually beginning as small, rough bumps, seborrheic keratosis can eventually thicken and develop a warty surface.  Most are brown, but these growths range in color from light tan to black.   Some seborrheic keratosis measure a fraction of an inch and as large as half-dollar size. A  following manual therapy techniques: were applied for 8 minutes, including:  Hold/relax lat   IR LLLD Manual stretch x3 mins  Re-assessment       Chinmay participated in neuromuscular re-education activities to improve: Coordination, Kinesthetic, Sense, Proprioception, Posture, and Motor Control for 20 minutes. The following activities were included:   Concentric/eccentric subscap re-ed with PT 3x8   SL Bridge with SA Press 15# KB 3x12  1/2 kneeling eccentric ball flip 1Kg 3x10      Not performed:  Snow angels at wall w/RTB 2x12  SA Wall slide with LT lift 3x12 YTB Handcuff  SL RDL with D2 Cable 7# 3x12  1/2 kneeling eccentric ball flip 3x12   1/2 kneeling thoracic rotation w/3D strap w/90/90 Body blade 3x10  SA Cable Press to Assisted posterior tilt 10# 3x12     Home Exercises and Patient Education Provided     Education provided:   - Reviewed/updated HEP    Written Home Exercises Provided: yes.  Exercises were reviewed and Chinmay was able to demonstrate them prior to the end of the session.  Chinmay demonstrated good  understanding of the education provided.     See EMR under patient instructions for exercises given.     Assessment     Chinmay's presents decreased c/o anterior shoulder pain with throwing. Treatment time was limited due to patient getting his appointment time mixed up and arriving late. Improved eccentric scapular control noted during today's interventions.     Chinmay Is progressing well towards his goals.     Pt will continue to benefit from skilled outpatient physical therapy to address the deficits listed in the problem list box on initial evaluation, provide pt/family education and to maximize pt's level of independence in the home and community environment. Pt prognosis is Good.     Pt's spiritual, cultural and educational needs considered and pt agreeable to plan of care and goals.    Anticipated barriers to physical therapy: None    Goals:  Short Term Goals: 4 weeks  1. Pt will be compliant with HEP 50%  seborrheic keratosis can be flat or raised.  Sometimes the surface feels smooth.    What often distinguishes these growths from other lesions is a waxy, pasted-on-the-skin or stuck-on-the-skin appearance.      Seborrheic keratoses are non-cancerous growths which occur with aging.  Most often forming on the chest and back, seborrheic keratosis  also can be found on the scalp, face, neck, arms and legs. They do not develop on the palms or the soles.    Treatment of seborrheic keratosis  are generally not necessary since they are benign growths. Sometimes a  seborrheic keratosis grows quickly, turns black, itches, or bleeds, making it difficult to distinguish from skin cancer and should be examined by your dermatologist to determine whether a biopsy is needed.     Treatment options    Liquid nitrogen is a cold, liquefied gas with a temperature of 196 degrees below zero Celsius (-321 degrees Fahrenheit).  It is used to freeze and destroy superficial skin growths such as warts and keratoses.    Electrocautery and Curettage involves anesthetizing the growth and using an electric current to cauterize (burn) the growth.  A curette is then used to scrape off the treated lesion. Sutures are not required.         Dr. Vasquez is prescribing this medication to be applied to left forehead, left cheek, left chest and right shoulder twice a day for 3 weeks weeks total.  The medication will cause some redness and irritation, as well as scabbing, this is an expectation of the medication. Vaseline can applied to the area being treated, in between applications, to help soothe any discomfort you may be experiencing. If the areas should bleed, which can happen, please stop using the medication and call the office.  We will have you schedule a clinic visit so the areas being treated can be examined and determine if treatment is complete.  If you should have any problems or concerned regarding how your treatment is progressing, please call  of prescribed amount. (Met)  2. The pt to demo improvement in parascapular strength of 1 grade via MMT (Met)  3.  Pt will improve FOTO to meet or exceed MCID (Met)  4. Pt will demo improvement in timing/sequencing of SH mechanics during loaded FE with 3# DB (Met)  5. Pt will report ability to throw at RPE of 60% without c/o anterior shoulder pain. (Met)     Long Term Goals: 16 weeks   Pt will be compliant with % of prescribed amount.   Pt will improve shoulder girdle strength via HHD to 110% LSI and 70% ER/IR ratio  Pt will demo PSET of at least 42 reps indicating good shoulder girdle endurance   Pt will report ability to pitch 1 inning with NPRS <2/10 and without limitation  The pt will report full participation in ADLs and IADLs without restrictions related to R shoulder pain.     Plan     Outpatient Physical Therapy 2 times weekly for 16 weeks to include the following interventions: Electrical Stimulation IFC/TENS, Manual Therapy, Moist Heat/ Ice, Neuromuscular Re-ed, Patient Education, Self Care, Therapeutic Activities, Therapeutic Exercise, and Dry Needling .     Meliton Monreal, PT , DPT, SCS                         the office and we will have you schedule a clinic visit for a wound check to examine how your treatment is [phone].

## 2023-04-26 ENCOUNTER — CLINICAL SUPPORT (OUTPATIENT)
Dept: REHABILITATION | Facility: HOSPITAL | Age: 23
End: 2023-04-26
Payer: COMMERCIAL

## 2023-04-26 DIAGNOSIS — M62.81 MUSCLE WEAKNESS OF RIGHT UPPER EXTREMITY: ICD-10-CM

## 2023-04-26 DIAGNOSIS — M89.9 SCAPULAR DYSFUNCTION: Primary | ICD-10-CM

## 2023-04-26 PROCEDURE — 97140 MANUAL THERAPY 1/> REGIONS: CPT

## 2023-04-26 PROCEDURE — 97110 THERAPEUTIC EXERCISES: CPT

## 2023-04-26 PROCEDURE — 97112 NEUROMUSCULAR REEDUCATION: CPT

## 2023-04-26 NOTE — PROGRESS NOTES
"  Physical Therapy Daily Treatment Note     Name: [facility] Number: [id number]    Therapy Diagnosis:   Encounter Diagnoses   Name Primary?    Scapular dysfunction Yes    Muscle weakness of right upper extremity      Physician: Nolan Tejeda DO    Visit Date: 4/26/2023  Physician Orders: PT Eval and Treat  Medical Diagnosis from Referral:   M25.511 (ICD-10-CM) - Acute pain of right shoulder   M67.819 (ICD-10-CM) - Tendinosis of rotator cuff   M75.41 (ICD-10-CM) - Shoulder impingement, right      Evaluation Date: 3/15/2023  Authorization Period Expiration: 12/31/2023  Plan of Care Expiration: 12/31/2023  Visit # / Visits authorized: 11 / 20 + Eval      Time In: 1804  Time Out: 1858  Total Billable Time: 54 minutes    Precautions: Standard    Subjective     Pt reports: He pitched 2 innings and his shoulder felt really good. He focused more on rotating through his thoracic spine which seemed to help significantly.     He was compliant with home exercise program.      Objective     Daily Measurements:    4/10/23:  Hip Passive Range of Motion:   Right  Left    Flexion 105 105   Extension 5 10   Ext. Rotation 35 35   Int. Rotation 15 25         Joint Mobility: Decreased inferior and anterior FA accessory mobility      Flexibility:     Jay Test Right  Left    Iliopsoas + +   Rectus Femoris  - -      4/12/23:    Strength:   Right Left   Middle Trap 3+/5 3+/5   Lower Trap 4/5 3+/5   Shoulder ER 90-90 (lbs) 35.2 35.5   Shoulder IR 90-90 (Lbs)  32.3 32.5        Daily Treatment       Chinmay received therapeutic exercises to develop strength, endurance, ROM, and flexibility for 20 minutes including:  Robot Lat Drill on wall 2x12  S/L D2 Eccentric 4# 3x12   Walking Lunge with 10# plate counter rotation <-> 15 ft 3x      Not performed:  Post cuff str at wall 3x20"  90/90 ER 3# Cable 4x5  Hip IR Rock Back drill x12  1/2 kneeling dynamic hip flexor str with Ball chop 3x12 ea.   Quadruped thoracic rotation 2x10 ea. "       Chinmay received the following manual therapy techniques: were applied for 8 minutes, including:  Hold/relax lat   IR LLLD Manual stretch x3 mins  Re-assessment       Chinmay participated in neuromuscular re-education activities to improve: Coordination, Kinesthetic, Sense, Proprioception, Posture, and Motor Control for 26 minutes. The following activities were included:   Concentric/eccentric subscap re-ed with PT 3x8   SL Bridge with SA Press 15# KB 3x12  1/2 kneeling eccentric ball flip 1Kg 3x10  1/2 kneeling thoracic rotation w/3D strap w/90/90 Body blade 3x10      Not performed:  SA Wall slide with LT lift 3x12 YTB Handcuff  SL RDL with D2 Cable 7# 3x12  SA Cable Press to Assisted posterior tilt 10# 3x12     Home Exercises and Patient Education Provided     Education provided:   - Reviewed/updated HEP    Written Home Exercises Provided: yes.  Exercises were reviewed and Chinmay was able to demonstrate them prior to the end of the session.  Chinmay demonstrated good  understanding of the education provided.     See EMR under patient instructions for exercises given.     Assessment     Chinmay continues to present with decreased sxs irritability and is responding well to recent interventions emphasizing coordination of thoracic rotation with parascapular control. Pt demo's slight loss of IR which is consistent with expected findings the day after pitching and improved following treatment.     Chinmay Is progressing well towards his goals.     Pt will continue to benefit from skilled outpatient physical therapy to address the deficits listed in the problem list box on initial evaluation, provide pt/family education and to maximize pt's level of independence in the home and community environment. Pt prognosis is Good.     Pt's spiritual, cultural and educational needs considered and pt agreeable to plan of care and goals.    Anticipated barriers to physical therapy: None    Goals:  Short Term Goals: 4 weeks  1. Pt will be  compliant with HEP 50% of prescribed amount. (Met)  2. The pt to demo improvement in parascapular strength of 1 grade via MMT (Met)  3.  Pt will improve FOTO to meet or exceed MCID (Met)  4. Pt will demo improvement in timing/sequencing of SH mechanics during loaded FE with 3# DB (Met)  5. Pt will report ability to throw at RPE of 60% without c/o anterior shoulder pain. (Met)     Long Term Goals: 16 weeks   Pt will be compliant with % of prescribed amount.   Pt will improve shoulder girdle strength via HHD to 110% LSI and 70% ER/IR ratio  Pt will demo PSET of at least 42 reps indicating good shoulder girdle endurance   Pt will report ability to pitch 1 inning with NPRS <2/10 and without limitation  The pt will report full participation in ADLs and IADLs without restrictions related to R shoulder pain.     Plan     Outpatient Physical Therapy 2 times weekly for 16 weeks to include the following interventions: Electrical Stimulation IFC/TENS, Manual Therapy, Moist Heat/ Ice, Neuromuscular Re-ed, Patient Education, Self Care, Therapeutic Activities, Therapeutic Exercise, and Dry Needling .     Meliton Monreal, PT , DPT, SCS

## 2023-04-29 ENCOUNTER — ATHLETIC TRAINING SESSION (OUTPATIENT)
Dept: SPORTS MEDICINE | Facility: CLINIC | Age: 23
End: 2023-04-29
Payer: COMMERCIAL

## 2023-05-01 ENCOUNTER — CLINICAL SUPPORT (OUTPATIENT)
Dept: REHABILITATION | Facility: HOSPITAL | Age: 23
End: 2023-05-01
Payer: COMMERCIAL

## 2023-05-01 DIAGNOSIS — M62.81 MUSCLE WEAKNESS OF RIGHT UPPER EXTREMITY: ICD-10-CM

## 2023-05-01 DIAGNOSIS — M89.9 SCAPULAR DYSFUNCTION: Primary | ICD-10-CM

## 2023-05-01 PROCEDURE — 97110 THERAPEUTIC EXERCISES: CPT

## 2023-05-01 PROCEDURE — 97140 MANUAL THERAPY 1/> REGIONS: CPT

## 2023-05-01 PROCEDURE — 97112 NEUROMUSCULAR REEDUCATION: CPT

## 2023-05-01 NOTE — PROGRESS NOTES
"  Physical Therapy Daily Treatment Note     Name: [facility] Number: [id number]    Therapy Diagnosis:   Encounter Diagnoses   Name Primary?    Scapular dysfunction Yes    Muscle weakness of right upper extremity      Physician: Nolan Tejeda DO    Visit Date: 5/1/2023  Physician Orders: PT Eval and Treat  Medical Diagnosis from Referral:   M25.511 (ICD-10-CM) - Acute pain of right shoulder   M67.819 (ICD-10-CM) - Tendinosis of rotator cuff   M75.41 (ICD-10-CM) - Shoulder impingement, right      Evaluation Date: 3/15/2023  Authorization Period Expiration: 12/31/2023  Plan of Care Expiration: 12/31/2023  Visit # / Visits authorized: 12 / 20 + Eval      Time In: 0955  Time Out: 1051  Total Billable Time: 57 minutes    Precautions: Standard    Subjective     Pt reports: He pitched 3 innings and his shoulder felt really good. He changed his mechanics a little on his slider which felt really good but being more on the side of the baseball.    He was compliant with home exercise program.      Objective     Daily Measurements:    4/10/23:  Hip Passive Range of Motion:   Right  Left    Flexion 105 105   Extension 5 10   Ext. Rotation 35 35   Int. Rotation 15 25         Joint Mobility: Decreased inferior and anterior FA accessory mobility      Flexibility:     Jay Test Right  Left    Iliopsoas + +   Rectus Femoris  - -      4/12/23:    Strength:   Right Left   Middle Trap 3+/5 3+/5   Lower Trap 4/5 3+/5   Shoulder ER 90-90 (lbs) 35.2 35.5   Shoulder IR 90-90 (Lbs)  32.3 32.5        5/1/23:    Strength:   Right   Middle Trap 4/5   Lower Trap 4/5   Shoulder ER 90-90 (lbs) 40.2   Shoulder IR 90-90 (Lbs)  35.3        Daily Treatment       Chinmay received therapeutic exercises to develop strength, endurance, ROM, and flexibility for 14 minutes including:  Slleper str 2# x5 mins  Landmine Press 3x15 45#        Not performed:  Post cuff str at wall 3x20"  90/90 ER 3# Cable 4x5  1/2 kneeling dynamic hip flexor str " with Ball chop 3x12 ea.   Quadruped thoracic rotation 2x10 ea.   Walking Lunge with 10# plate counter rotation <-> 15 ft 3x      Chinmay received the following manual therapy techniques: were applied for 8 minutes, including:  IR LLLD Manual stretch x3 mins  Re-assessment w/updated objective measures as above.        Chinmay participated in neuromuscular re-education activities to improve: Coordination, Kinesthetic, Sense, Proprioception, Posture, and Motor Control for 35 minutes. The following activities were included:   Concentric/eccentric subscap re-ed with PT 3x8   D2 Hinge ANCOR 5# 3x10   1/2 kneeling thoracic rotation w/3D strap w/90/90 [place] BTB 3x10  Prone Subscap re-ed 2# 3x10   Prone MT/LT Re-ed Lvl 3 2# 3x12ea.       Not performed:  SA Cable Press to Assisted posterior tilt 10# 3x12   SL Bridge with SA Press 15# KB 3x12  1/2 kneeling eccentric ball flip 1Kg 3x10    Home Exercises and Patient Education Provided     Education provided:   - Reviewed/updated HEP    Written Home Exercises Provided: yes.  Exercises were reviewed and Chinmay was able to demonstrate them prior to the end of the session.  Chinmay demonstrated good  understanding of the education provided.     See EMR under patient instructions for exercises given.     Assessment     Chinmay continues to tolerate increased volume in pitching without exacerbation of shoulder pain. Updated strength measurements via HHD show notable improvement as noted in objective section.     Chinmay Is progressing well towards his goals.     Pt will continue to benefit from skilled outpatient physical therapy to address the deficits listed in the problem list box on initial evaluation, provide pt/family education and to maximize pt's level of independence in the home and community environment. Pt prognosis is Good.     Pt's spiritual, cultural and educational needs considered and pt agreeable to plan of care and goals.    Anticipated barriers to physical therapy:  None    Goals:  Short Term Goals: 4 weeks  1. Pt will be compliant with HEP 50% of prescribed amount. (Met)  2. The pt to demo improvement in parascapular strength of 1 grade via MMT (Met)  3.  Pt will improve FOTO to meet or exceed MCID (Met)  4. Pt will demo improvement in timing/sequencing of SH mechanics during loaded FE with 3# DB (Met)  5. Pt will report ability to throw at RPE of 60% without c/o anterior shoulder pain. (Met)     Long Term Goals: 16 weeks   Pt will be compliant with % of prescribed amount.   Pt will improve shoulder girdle strength via HHD to 110% LSI and 70% ER/IR ratio  Pt will demo PSET of at least 42 reps indicating good shoulder girdle endurance   Pt will report ability to pitch 1 inning with NPRS <2/10 and without limitation  The pt will report full participation in ADLs and IADLs without restrictions related to R shoulder pain.     Plan     Outpatient Physical Therapy 2 times weekly for 16 weeks to include the following interventions: Electrical Stimulation IFC/TENS, Manual Therapy, Moist Heat/ Ice, Neuromuscular Re-ed, Patient Education, Self Care, Therapeutic Activities, Therapeutic Exercise, and Dry Needling .     Meliton Mnoreal, PT , DPT, SCS

## 2023-05-05 ENCOUNTER — CLINICAL SUPPORT (OUTPATIENT)
Dept: REHABILITATION | Facility: HOSPITAL | Age: 23
End: 2023-05-05
Payer: COMMERCIAL

## 2023-05-05 DIAGNOSIS — M89.9 SCAPULAR DYSFUNCTION: Primary | ICD-10-CM

## 2023-05-05 DIAGNOSIS — M62.81 MUSCLE WEAKNESS OF RIGHT UPPER EXTREMITY: ICD-10-CM

## 2023-05-05 PROCEDURE — 97110 THERAPEUTIC EXERCISES: CPT

## 2023-05-05 PROCEDURE — 97140 MANUAL THERAPY 1/> REGIONS: CPT

## 2023-05-05 PROCEDURE — 97112 NEUROMUSCULAR REEDUCATION: CPT

## 2023-05-05 NOTE — PROGRESS NOTES
Physical Therapy Daily Treatment Note     Name: [facility] Number: [id number]    Therapy Diagnosis:   Encounter Diagnoses   Name Primary?    Scapular dysfunction Yes    Muscle weakness of right upper extremity      Physician: Nolan Tejeda DO    Visit Date: 5/5/2023  Physician Orders: PT Eval and Treat  Medical Diagnosis from Referral:   M25.511 (ICD-10-CM) - Acute pain of right shoulder   M67.819 (ICD-10-CM) - Tendinosis of rotator cuff   M75.41 (ICD-10-CM) - Shoulder impingement, right      Evaluation Date: 3/15/2023  Authorization Period Expiration: 12/31/2023  Plan of Care Expiration: 12/31/2023  Visit # / Visits authorized: 13 / 20 + Eval      Time In: 0805  Time Out: 0900  Total Billable Time: 55 minutes    Precautions: Standard    Subjective     Pt reports: His shoulder is feeling good. He did a light bullpen yesterday and felt great. He is planning to start today and pitch around 4 innings.     He was compliant with home exercise program.      Objective     Daily Measurements:    4/10/23:  Hip Passive Range of Motion:   Right  Left    Flexion 105 105   Extension 5 10   Ext. Rotation 35 35   Int. Rotation 15 25         Joint Mobility: Decreased inferior and anterior FA accessory mobility      Flexibility:     Jay Test Right  Left    Iliopsoas + +   Rectus Femoris  - -      4/12/23:    Strength:   Right Left   Middle Trap 3+/5 3+/5   Lower Trap 4/5 3+/5   Shoulder ER 90-90 (lbs) 35.2 35.5   Shoulder IR 90-90 (Lbs)  32.3 32.5        5/1/23:    Strength:   Right   Middle Trap 4/5   Lower Trap 4/5   Shoulder ER 90-90 (lbs) 40.2   Shoulder IR 90-90 (Lbs)  35.3        Daily Treatment       Chinmay received therapeutic exercises to develop strength, endurance, ROM, and flexibility for 15 minutes including:  Sleeper str 2# x5 mins  Single leg landmine Shrimp squat 3x12 ea. 35#      Not performed:  90/90 ER 3# Cable 4x5  1/2 kneeling dynamic hip flexor str with Ball chop 3x12 ea.   Quadruped thoracic  rotation 2x10 ea.   Walking Lunge with 10# plate counter rotation <-> 15 ft 3x  Landmine Press 3x15 45#      Chinmay received the following manual therapy techniques: were applied for 8 minutes, including:  IR LLLD Manual stretch x3 mins  Post cuff hold/relax       Chinmay participated in neuromuscular re-education activities to improve: Coordination, Kinesthetic, Sense, Proprioception, Posture, and Motor Control for 32 minutes. The following activities were included:   Concentric/eccentric subscap re-ed with PT 3x8   SA Cable Press 10# 3x12   RFE D2 RDL 10# DB 3x10 ea.   Rotational Landmine power Press 35# 3x8 ea.       Not performed:  Prone MT/LT Re-ed Lvl 3 2# 3x12ea.   SL Bridge with SA Press 15# KB 3x12  1/2 kneeling eccentric ball flip 1Kg 3x10  Prone Subscap re-ed 2# 3x10   D2 Hinge ANCOR 5# 3x10   1/2 kneeling thoracic rotation w/3D strap w/90/90 BodyBlade 4x5    Home Exercises and Patient Education Provided     Education provided:   - Reviewed/updated HEP    Written Home Exercises Provided: yes.  Exercises were reviewed and Chinmay was able to demonstrate them prior to the end of the session.  Chinmay demonstrated good  understanding of the education provided.     See EMR under patient instructions for exercises given.     Assessment     Chinmay continues to progress with improved tolerance to pitching with decreased pain. Progressed functional CKC stability and strengthening with emphasis on lead leg with pitching due to importance of the lead leg providing a stable base for pitching and transmitting force appropriately through the kinetic chain to decrease stress at the shoulder/elbow. This was appropriately challenging and will be included in pre game routine.     Chinmay Is progressing well towards his goals.     Pt will continue to benefit from skilled outpatient physical therapy to address the deficits listed in the problem list box on initial evaluation, provide pt/family education and to maximize pt's level of  independence in the home and community environment. Pt prognosis is Good.     Pt's spiritual, cultural and educational needs considered and pt agreeable to plan of care and goals.    Anticipated barriers to physical therapy: None    Goals:  Short Term Goals: 4 weeks  1. Pt will be compliant with HEP 50% of prescribed amount. (Met)  2. The pt to demo improvement in parascapular strength of 1 grade via MMT (Met)  3.  Pt will improve FOTO to meet or exceed MCID (Met)  4. Pt will demo improvement in timing/sequencing of SH mechanics during loaded FE with 3# DB (Met)  5. Pt will report ability to throw at RPE of 60% without c/o anterior shoulder pain. (Met)     Long Term Goals: 16 weeks   Pt will be compliant with % of prescribed amount.   Pt will improve shoulder girdle strength via HHD to 110% LSI and 70% ER/IR ratio  Pt will demo PSET of at least 42 reps indicating good shoulder girdle endurance   Pt will report ability to pitch 1 inning with NPRS <2/10 and without limitation  The pt will report full participation in ADLs and IADLs without restrictions related to R shoulder pain.     Plan     Outpatient Physical Therapy 2 times weekly for 16 weeks to include the following interventions: Electrical Stimulation IFC/TENS, Manual Therapy, Moist Heat/ Ice, Neuromuscular Re-ed, Patient Education, Self Care, Therapeutic Activities, Therapeutic Exercise, and Dry Needling .     Meliton Monreal, PT , DPT, SCS

## 2023-05-08 ENCOUNTER — CLINICAL SUPPORT (OUTPATIENT)
Dept: REHABILITATION | Facility: HOSPITAL | Age: 23
End: 2023-05-08
Payer: COMMERCIAL

## 2023-05-08 DIAGNOSIS — M62.81 MUSCLE WEAKNESS OF RIGHT UPPER EXTREMITY: ICD-10-CM

## 2023-05-08 DIAGNOSIS — M89.9 SCAPULAR DYSFUNCTION: Primary | ICD-10-CM

## 2023-05-08 PROCEDURE — 97110 THERAPEUTIC EXERCISES: CPT

## 2023-05-08 PROCEDURE — 97140 MANUAL THERAPY 1/> REGIONS: CPT

## 2023-05-08 PROCEDURE — 97112 NEUROMUSCULAR REEDUCATION: CPT

## 2023-05-08 NOTE — PROGRESS NOTES
Physical Therapy Daily Treatment Note     Name: [facility] Number: [id number]    Therapy Diagnosis:   Encounter Diagnoses   Name Primary?    Scapular dysfunction Yes    Muscle weakness of right upper extremity      Physician: Nolan Tjeeda DO    Visit Date: 5/8/2023  Physician Orders: PT Eval and Treat  Medical Diagnosis from Referral:   M25.511 (ICD-10-CM) - Acute pain of right shoulder   M67.819 (ICD-10-CM) - Tendinosis of rotator cuff   M75.41 (ICD-10-CM) - Shoulder impingement, right      Evaluation Date: 3/15/2023  Authorization Period Expiration: 12/31/2023  Plan of Care Expiration: 12/31/2023  Visit # / Visits authorized: 14 / 20 + Eval      Time In: 1000  Time Out: 1100  Total Billable Time: 60 minutes    Precautions: Standard    Subjective     Pt reports: He noted some soreness in his posterior shoulder the day after throwing. No real anterior shoulder pain.     He was compliant with home exercise program.      Objective     Daily Measurements:    4/10/23:  Hip Passive Range of Motion:   Right  Left    Flexion 105 105   Extension 5 10   Ext. Rotation 35 35   Int. Rotation 15 25         Joint Mobility: Decreased inferior and anterior FA accessory mobility      Flexibility:     Jay Test Right  Left    Iliopsoas + +   Rectus Femoris  - -      4/12/23:    Strength:   Right Left   Middle Trap 3+/5 3+/5   Lower Trap 4/5 3+/5   Shoulder ER 90-90 (lbs) 35.2 35.5   Shoulder IR 90-90 (Lbs)  32.3 32.5        5/1/23:    Strength:   Right   Middle Trap 4/5   Lower Trap 4/5   Shoulder ER 90-90 (lbs) 40.2   Shoulder IR 90-90 (Lbs)  35.3        Daily Treatment       Chinmay received therapeutic exercises to develop strength, endurance, ROM, and flexibility for 21 minutes including:  Sleeper str 2# x4 mins  Quadruped thoracic rotation 2x12 ea.   YTB ER Step back 3x15  GTB IR Step out 3x15      Not performed:  90/90 ER 3# Cable 4x5  1/2 kneeling dynamic hip flexor str with Ball chop 3x12 ea.   Walking Lunge  with 10# plate counter rotation <-> 15 ft 3x  Single leg landmine Shrimp squat 3x12 ea. 35#      Chinmay received the following manual therapy techniques: were applied for 8 minutes, including:  IR LLLD Manual stretch x3 mins  Post cuff hold/relax       Chinmay participated in neuromuscular re-education activities to improve: Coordination, Kinesthetic, Sense, Proprioception, Posture, and Motor Control for 31 minutes. The following activities were included:   Lat mobility drill 2x12   Prone Subscap re-ed 2# 3x10   Split Squat Landmine Press 45# 3x12 ea.   Landmine SL RDL 45# 3x10 ea.   1/2 kneeling thoracic rotation with BB D2 5x5      Not performed:  Prone MT/LT Re-ed Lvl 3 2# 3x12ea.   SL Bridge with SA Press 15# KB 3x12  1/2 kneeling eccentric ball flip 1Kg 3x10  D2 Hinge ANCOR 5# 3x10   1/2 kneeling thoracic rotation w/3D strap w/90/90 BodyBlade 4x5  SA Cable Press 10# 3x12   RFE D2 RDL 10# DB 3x10 ea.    Home Exercises and Patient Education Provided     Education provided:   - Reviewed/updated HEP    Written Home Exercises Provided: yes.  Exercises were reviewed and Chinmay was able to demonstrate them prior to the end of the session.  Chinmay demonstrated good  understanding of the education provided.     See EMR under patient instructions for exercises given.     Assessment     Chinmay presents with slight loss of IR and posterior cuff extensibility that improved with hold/relax and gentle stretching. Continued with shoulder girdle strength and stabilization progressions with emphasis on muscular endurance and appropriate timing. Chinmay was able to perform 90/90 step backs and body blade with no s/o shoulder pinching.     Chinmay Is progressing well towards his goals.     Pt will continue to benefit from skilled outpatient physical therapy to address the deficits listed in the problem list box on initial evaluation, provide pt/family education and to maximize pt's level of independence in the home and community environment. Pt  prognosis is Good.     Pt's spiritual, cultural and educational needs considered and pt agreeable to plan of care and goals.    Anticipated barriers to physical therapy: None    Goals:  Short Term Goals: 4 weeks  1. Pt will be compliant with HEP 50% of prescribed amount. (Met)  2. The pt to demo improvement in parascapular strength of 1 grade via MMT (Met)  3.  Pt will improve FOTO to meet or exceed MCID (Met)  4. Pt will demo improvement in timing/sequencing of SH mechanics during loaded FE with 3# DB (Met)  5. Pt will report ability to throw at RPE of 60% without c/o anterior shoulder pain. (Met)     Long Term Goals: 16 weeks   Pt will be compliant with % of prescribed amount.   Pt will improve shoulder girdle strength via HHD to 110% LSI and 70% ER/IR ratio  Pt will demo PSET of at least 42 reps indicating good shoulder girdle endurance   Pt will report ability to pitch 1 inning with NPRS <2/10 and without limitation  The pt will report full participation in ADLs and IADLs without restrictions related to R shoulder pain.     Plan     Outpatient Physical Therapy 2 times weekly for 16 weeks to include the following interventions: Electrical Stimulation IFC/TENS, Manual Therapy, Moist Heat/ Ice, Neuromuscular Re-ed, Patient Education, Self Care, Therapeutic Activities, Therapeutic Exercise, and Dry Needling .     Meliton Monreal, PT , DPT, SCS

## 2023-05-15 ENCOUNTER — ATHLETIC TRAINING SESSION (OUTPATIENT)
Dept: SPORTS MEDICINE | Facility: CLINIC | Age: 23
End: 2023-05-15
Payer: COMMERCIAL

## 2023-05-15 NOTE — PROGRESS NOTES
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 It wasn't his best outing and he didn't feel great during. He reports he hasn't regressed any, but he thinks he has hit a plateau in his recovery.     Daily Treatment     Manual stretch  E-stim  __________________________________________________________    Visit Date: 5/2/2023    Reason For Visit: Rehab  Affected Area: Right Shoulder    Chinmay completed the following exercises, treatments, and modalities.     Warm-Up Reps/Sets/Time Weight #   Stretch     Roll out                      Modality/Manual Therapy Time   E-stim 10 min   Fascial Cupping 5 min             Exercise Reps/Sets/Time Weight #   No Monies 3 x 10    Supported Prone IR/ER 3 x 10    PB Seated Y 3 x 10    Serratus Pushup Plus 3 x 8    Supported Banded IR/ER 3 x 10    Rhythmic Y Raises 3 x 8    Banded IR w/ Press 3 x 10           Patient reports feeling pretty good.  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 He started twice this week and threw more than he has in awhile, so overall he is very content.  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 He was limited to 3 innings, but he felt really good and pitched well. He reports by the  third inning that he was starting to get fatigued, but for the most part had very low pain.     Daily Treatment     Manual stretch  E-stim  __________________________________________________________    Visit Date: 4/27/2023    Reason For Visit: Rehab  Affected Area: Right Shoulder    Chinmay completed the following exercises, treatments, and modalities.     Warm-Up Reps/Sets/Time Weight #   Stretch     Roll out                      Modality/Manual Therapy Time   E-stim 10 min   Fascial Cupping 5 min             Exercise Reps/Sets/Time Weight #   No Monies 3 x 10    Wall Zinc 3 x 10    Banded IR/ER 3 x 10                Patient reports feeling pretty good. The bus ride made him feel stiff, but overall he feels pretty good.  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 He was limited to 2 innings by the coaches, but he felt like he could go 3 or 4 innings.      Daily Treatment     Manual stretch  E-stim      King MAGALLANES, LAT, ATC, BRUNILDA    [facility]

## 2023-05-17 ENCOUNTER — CLINICAL SUPPORT (OUTPATIENT)
Dept: REHABILITATION | Facility: HOSPITAL | Age: 23
End: 2023-05-17
Payer: COMMERCIAL

## 2023-05-17 DIAGNOSIS — M89.9 SCAPULAR DYSFUNCTION: Primary | ICD-10-CM

## 2023-05-17 DIAGNOSIS — M62.81 MUSCLE WEAKNESS OF RIGHT UPPER EXTREMITY: ICD-10-CM

## 2023-05-17 PROCEDURE — 97112 NEUROMUSCULAR REEDUCATION: CPT

## 2023-05-17 PROCEDURE — 97110 THERAPEUTIC EXERCISES: CPT

## 2023-05-17 PROCEDURE — 97140 MANUAL THERAPY 1/> REGIONS: CPT

## 2023-05-18 NOTE — PROGRESS NOTES
Physical Therapy Daily Treatment Note     Name: [facility] Number: [id number]    Therapy Diagnosis:   Encounter Diagnoses   Name Primary?    Scapular dysfunction Yes    Muscle weakness of right upper extremity      Physician: Nolan Tejeda DO    Visit Date: 5/17/2023  Physician Orders: PT Eval and Treat  Medical Diagnosis from Referral:   M25.511 (ICD-10-CM) - Acute pain of right shoulder   M67.819 (ICD-10-CM) - Tendinosis of rotator cuff   M75.41 (ICD-10-CM) - Shoulder impingement, right      Evaluation Date: 3/15/2023  Authorization Period Expiration: 12/31/2023  Plan of Care Expiration: 12/31/2023  Visit # / Visits authorized: 15 / 20 + Eval    Time In: 0200 pm  Time Out: 0300 pm  Total Billable Time: 60 minutes    Precautions: Standard    Subjective     Pt reports: he has increased R sided lower thoracic pain after front squatting today. He had it cupped in the training room and is having increased pain and difficulty with OH movements due to this    He was compliant with home exercise program.      Objective     Daily Measurements:    4/10/23:  Hip Passive Range of Motion:   Right  Left    Flexion 105 105   Extension 5 10   Ext. Rotation 35 35   Int. Rotation 15 25         Joint Mobility: Decreased inferior and anterior FA accessory mobility      Flexibility:     Jay Test Right  Left    Iliopsoas + +   Rectus Femoris  - -      4/12/23:    Strength:   Right Left   Middle Trap 3+/5 3+/5   Lower Trap 4/5 3+/5   Shoulder ER 90-90 (lbs) 35.2 35.5   Shoulder IR 90-90 (Lbs)  32.3 32.5        5/1/23:    Strength:   Right   Middle Trap 4/5   Lower Trap 4/5   Shoulder ER 90-90 (lbs) 40.2   Shoulder IR 90-90 (Lbs)  35.3        Daily Treatment       Chinmay received therapeutic exercises to develop strength, endurance, ROM, and flexibility for 10 minutes including:  Quadruped thoracic rotation 2x12 ea.   SA hand heel rocks; 3 x 10      Not performed:  90/90 ER 3# Cable 4x5  1/2 kneeling dynamic hip flexor  "str with Ball chop 3x12 ea.   Walking Lunge with 10# plate counter rotation <-> 15 ft 3x  Single leg landmine Shrimp squat 3x12 ea. 35#  Sleeper str 2# x4 mins  YTB ER Step back 3x15  GTB IR Step out 3x15    Chinmay received the following manual therapy techniques: were applied for 30 minutes, including:  IR LLLD Manual stretch x3 mins  Post cuff hold/relax  Lat hold relax  T/S HVLAT  Inf/lateral hip mobs w/ belt; Gr 3-4     Chinmay participated in neuromuscular re-education activities to improve: Coordination, Kinesthetic, Sense, Proprioception, Posture, and Motor Control for 20 minutes. The following activities were included:   RTC motor control drill against manual resistance  Hip ER Pretzel; 20x w/ 5" hold  Hip IR seated; 20x w/ 5" hold    Not performed:  Prone MT/LT Re-ed Lvl 3 2# 3x12ea.   SL Bridge with SA Press 15# KB 3x12  1/2 kneeling eccentric ball flip 1Kg 3x10  D2 Hinge ANCOR 5# 3x10   1/2 kneeling thoracic rotation w/3D strap w/90/90 BodyBlade 4x5  SA Cable Press 10# 3x12   RFE D2 RDL 10# DB 3x10 ea.  Lat mobility drill 2x12   Prone Subscap re-ed 2# 3x10   Split Squat Landmine Press 45# 3x12 ea.   Landmine SL RDL 45# 3x10 ea.   1/2 kneeling thoracic rotation with BB D2 5x5      Home Exercises and Patient Education Provided     Education provided:   - Reviewed/updated HEP    Written Home Exercises Provided: yes.  Exercises were reviewed and Chinmay was able to demonstrate them prior to the end of the session.  Chinmay demonstrated good  understanding of the education provided.     See EMR under patient instructions for exercises given.     Assessment     Chinmay presented with slightly increased shoulder pain but notable lower thoracic back pain following weight training today. He was unable to fully flex, extend, or ipsilaterally rotate due to pain in his R sided thoracic spine. He was able unable to reach overhead which limited interventions for his shoulder. He was able to perform manually resisted shoulder " interventions in hookyling but no other UE interventions. He did demonstrate what appeared to be a closing restriction at T9-10 on his right which improved following manual interventions but overlying soft tissue tone and bruising from cupping in athletic training room limited exam of area. He responded well to manual interventions for hips to decrease load to shoulder during throwing by improving full kinetic chain efficiency. Will reassess shoulder at next session when back pain is improved    Chinmay Is progressing well towards his goals.     Pt will continue to benefit from skilled outpatient physical therapy to address the deficits listed in the problem list box on initial evaluation, provide pt/family education and to maximize pt's level of independence in the home and community environment. Pt prognosis is Good.     Pt's spiritual, cultural and educational needs considered and pt agreeable to plan of care and goals.    Anticipated barriers to physical therapy: None    Goals:  Short Term Goals: 4 weeks  1. Pt will be compliant with HEP 50% of prescribed amount. (Met)  2. The pt to demo improvement in parascapular strength of 1 grade via MMT (Met)  3.  Pt will improve FOTO to meet or exceed MCID (Met)  4. Pt will demo improvement in timing/sequencing of SH mechanics during loaded FE with 3# DB (Met)  5. Pt will report ability to throw at RPE of 60% without c/o anterior shoulder pain. (Met)     Long Term Goals: 16 weeks   Pt will be compliant with % of prescribed amount.   Pt will improve shoulder girdle strength via HHD to 110% LSI and 70% ER/IR ratio  Pt will demo PSET of at least 42 reps indicating good shoulder girdle endurance   Pt will report ability to pitch 1 inning with NPRS <2/10 and without limitation  The pt will report full participation in ADLs and IADLs without restrictions related to R shoulder pain.     Plan     Outpatient Physical Therapy 2 times weekly for 16 weeks to include the following  interventions: Electrical Stimulation IFC/TENS, Manual Therapy, Moist Heat/ Ice, Neuromuscular Re-ed, Patient Education, Self Care, Therapeutic Activities, Therapeutic Exercise, and Dry Needling .     Sakshi Donovan, PT , DPT    Meliton Monreal PT, DPT, SCS

## 2023-05-27 ENCOUNTER — ATHLETIC TRAINING SESSION (OUTPATIENT)
Dept: SPORTS MEDICINE | Facility: CLINIC | Age: 23
End: 2023-05-27
Payer: COMMERCIAL

## 2023-06-07 NOTE — PROGRESS NOTES
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 He reports he hurt pretty bad in his shoulder and his back, but was able to work through it and throw effectively. Plan to shut down for a day or two to calm down and get ready for conference tournament starting next week.    __________________________________________________________    Visit Date: 5/18/2023    Affected Area: Right Shoulder      Subjective     Student-athlete reports: He feels no real change in his shoulder today. He reports that he probably won't throw today or tomorrow due to his back pain.      Daily Treatment     Manual stretching, soft tissue massage, Active stretching, E-Stim    __________________________________________________________    Visit Date: 5/16/2023    Reason For Visit: Rehab  Affected Area: Right Shoulder    Chinmay completed the following exercises, treatments, and modalities.     Modality/Manual Therapy Time   Manual Stretch                  Exercise Reps/Sets/Time Weight #   Active Stretching     Banded Iso Walk outs     Prone Ret w/ IR/ER     Body Blade                 Patient reports feeling the same as yesterday. He reports that he is pretty sore from the exercises and throwing. Plan to continue rehab.  __________________________________________________________    Visit Date: 5/15/2023    Reason For Visit: Rehab  Affected Area: Right Shoulder    Chinmay completed the following exercises, treatments, and modalities.     Warm-Up Reps/Sets/Time Weight #   Heat                           Modality/Manual Therapy Time   Manual  Stretch    E-Stim    Fascial Cupping                        Exercise Reps/Sets/Time Weight #   Active Stretching     Banded Iso Walk outs     Prone Ret w/ IR/ER     Serratus Press w/ Rock     Seated Banded Y on PB     Banded Single arm IR Press     Body Blade                                      Patient reports feeling okay. He is pretty sore from the weekend and overall his shoulder hasn't felt as good. He reports he understands he is at the point where, with only two weeks left, he probably isn't going to make any more significant strides in his progression until once the season ends.      King MAGALLANES, LAT, ATC, BRUNILDA    [facility]

## 2023-06-07 NOTE — PROGRESS NOTES
"    [facility]                      [facility]       Weekly Treatment Log       Name: Neftaly Cruz  [facility] Number: [id number]  Sport: Men's Baseball    __________________________________________________________    Visit Date: 5/27/2023    Affected Area: Right Shoulder    He feels really sore, but isn't throwing today at all.    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 He reports that he is going to start the game this morning and thinks he is ready for it.    __________________________________________________________    Visit Date: 5/26/2023    Affected Area: Right Shoulder      Subjective     Student-athlete reports: That he feels pretty good today. He reports that he is going to start the game this morning and thinks he is ready for it.    He reports after his outing, that he feels how he normally does after the throws. He reports that his outing went okay and his velocity was normal.  __________________________________________________________    Visit Date: 5/25/2023    Affected Area: Right Shoulder      Subjective     Student-athlete reports: That he feels about the same as yesterday. He reports throwing feels fine. He reports he doesn't think he will throw today, but he might depending on the situation. He thinks it is more likely he will throw tomorrow instead.      Daily Treatment     Active/Passive stretching, e-stim      Plan     Plan to get ready for when he will throw.   __________________________________________________________    Visit Date: 5/24/2023    Affected Area: Right Shoulder      Subjective     Student-athlete reports: That he feels about the same as he has the past couple of weeks. His pain came back down to a manageable level after the weekend and is now back on his "plateau" of pain. He reports he doesn't know what the plan is for him on the mound this week. He reports he probably won't throw today, but " maybe tomorrow or Friday.      Daily Treatment     Active/Passive stretching, fascial cupping, e-stim      Plan     Plan to get ready for when he will throw.   __________________________________________________________    Visit Date: 5/22/2023    Reason For Visit: Rehab  Affected Area: Right Shoulder    Chinmay completed the following exercises, treatments, and modalities.     Warm-Up Reps/Sets/Time Weight #   Heat                 Modality/Manual Therapy Time   Active/Passive Stretching    Fascial Cupping              Exercise Reps/Sets/Time Weight #   Banded IR/ER Walk outs     Prone Ret with IR     Serratus Rockback with Raise     Body Blade     Wall [place]                      Patient reports feeling still pretty rough after Saturday. He reports yesterday he was in a lot of pain, and today is better, but he is still pretty fatigued and sore. Plan to not throw today and get ready for conference tournament this week.    King MAGALLANES, LAT, ATC, BRUNILDA    [employer] [facility] [place]

## 2023-06-07 NOTE — PROGRESS NOTES
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 He reports the bus ride made it feel a little tight yesterday, but once he did his stretches at the field, it was fine.  __________________________________________________________    Visit Date: 5/22/2023    Affected Area: Right T-Spine      Subjective     Student-athlete reports: That his back feels tight but is feeling better today. He felt rough yesterday after his outing on Saturday, but today feels better. He thinks by the start of the first game on Wednesday, he will be 100%.    Daily Treatment     Manual massage, heat        Plan     Plan to continue to treat.      King MAGALLANES, LAT, ATC, BRUNILDA    [facility]

## 2023-06-07 NOTE — PROGRESS NOTES
[facility]                      [facility]       Weekly Treatment Log       Name: Neftaly Cruz  [facility] Number: [id number]  Sport: Men's Baseball    __________________________________________________________    Visit Date: 5/20/2023    Affected Area: Right T-Spine      Subjective     Student-athlete reports: He feels a bout the same as yesterday. He reports that he thinks he can throw an inning if he needs to today.    Patient pitched three innings in relief today. He reports that once he got going, he didn't feel his back as much, and it got better every inning. He reports once he finished and sat down, it tightened up on him and hurt quite a bit.    Daily Treatment     Heat, light manual massage    T-Spine manipulation and exercises by [facility]    Patient reports moderate relief after treatment        Plan     Plan to be an emergency option out of the bullpen today for the game.  __________________________________________________________    Visit Date: 5/18/2023    Affected Area: Right T-Spine      Subjective     Student-athlete reports: He feels a little bit better today, but it still hurts him to twist and bend over. He reports he also feels really tight.      Daily Treatment     Heat, light manual massage    Patient reports moderate relief after treatment        Plan     Plan to hold from baseball today and maybe toss tomorrow. Potentially will pitch on Saturday pending how he feels.  __________________________________________________________    Visit Date: 5/17/2023    Affected Area: Right T-Spine      Subjective     Student-athlete reports: That while lifting today, he tweaked his back and is now in pretty significant pain. He reports he was doing a warm up set on front squat went he isn't sure how it happened, but he felt a sharp pain in his right side of his t-spine. He reports the pain to be around a 7/10 at this moment and was unable  to finish the lifts.         Objective     POP and palpable tension over the right side perispinal muscles. No other obvious deformity, no bruising or swelling.      Daily Treatment     E-Stim, Fascial Cupping    Patient does not report much relief after the treatment        Plan     Plan to hold from baseball for tomorrow and Friday, will play pitching on Saturday by ear depending how he feels.    Patient is seeing Meliton in PT today and reports he will likely ask for his help on this as well.        King MAGALLANES, LAT, ATC, BRUNILDA    [facility] [place]

## 2023-06-15 ENCOUNTER — PATIENT MESSAGE (OUTPATIENT)
Dept: SPORTS MEDICINE | Facility: CLINIC | Age: 23
End: 2023-06-15
Payer: COMMERCIAL

## 2023-06-16 DIAGNOSIS — M25.511 ACUTE PAIN OF RIGHT SHOULDER: Primary | ICD-10-CM

## 2023-06-16 DIAGNOSIS — M25.811 SHOULDER IMPINGEMENT, RIGHT: ICD-10-CM

## 2023-06-16 DIAGNOSIS — M67.819 TENDINOSIS OF ROTATOR CUFF: ICD-10-CM

## 2024-01-11 ENCOUNTER — ATHLETIC TRAINING SESSION (OUTPATIENT)
Dept: SPORTS MEDICINE | Facility: CLINIC | Age: 24
End: 2024-01-11
Payer: COMMERCIAL

## 2024-01-11 DIAGNOSIS — M25.511 ACUTE PAIN OF RIGHT SHOULDER: Primary | ICD-10-CM

## 2024-01-11 NOTE — PROGRESS NOTES
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    Student athlete has pmhx of RTC instability and inflammation         Pain      ROS              Objective:       General: Neftaly is well-developed, well-nourished, appears stated age, in no acute distress, alert and oriented to time, place and person.             Right Shoulder Exam   Right shoulder exam is normal.    Left Shoulder Exam   Left shoulder exam is normal.           Assessment:     Status: F - Full Participation    Date Seen: 1/09/2024    Date of Injury: N/A    Date Out: N/A    Date Cleared: N/A      Plan:       1. Continue to work on strength and ROM   2. Physician Referral: no  3. ED Referral: no  4. Parent/Guardian Notified: No  5. All questions were answered, ath. will contact me for questions or concerns in  the interim.  6.         Eligible to use School Insurance: Yes    Chinmay completed:    DATE OF SERVICE: 1/09/24  Dominant Arm: right Arm     Chinmay Came in for arm care   MODALITIES:    Cryotherapy / Thermotherapy Duration  (Mins) Add. Tx Parameters / Comment   []Cold Tub / Whirlpool (50-60 F)     []Contrast Bath (105-110 F & 50-65 F)     []Game Ready     []Hot Pack     []Hot Tub / Whirlpool ( F)     []Ice Massage     []Ice Pack     []Paraffin Wax (126-130 F)     []Vapocoolant Spray        Comment:       Electrotherapy Duration  (Mins) Add. Tx Parameters / Comment   []Combo     []E-Stim - IFC     []E-Stim - Premod     []E-Stim - Bahraini     []E-Stim - TENS     []E-Stim - Other       Comment:      Ultrasound Duty Cycle   (%) Freq.  (Mhz) Intensity   (w/cm2) Duration  (Mins) Add. Tx Parameters / Comment   []Combo        []Phonophoresis     Meds:   []Ultrasound         []Ultrasound and E-Stim          Comment:        Massage Duration  (Mins) Add. Tx Parameters / Comment   [x]Massage - IASTM     []Massage - Scar Tissue     []Massage - Self Administered      [x]Massage - Therapeutic     []Myofascial Release        Comment:      Other Modalities Duration  (Mins)  Add. Tx Parameters / Comment   []Active Release     [x]Cupping     []Dry Needling     []Intermittent Compression      []Traction      []Other:       Comment:      THERAPEUTIC EXERCISES:    Stretching Rehab Other   []Stretching - PNF []Rehab - Wrist/Hand []Foam Roller []RTP - Concussion Protocol   [x]Stretching - Static []Rehab - Elbow []Functional Exercises []RTP - Sport Specific    []Rehab - Shoulder []Joint Mobilization []Strengthening Exercises    []Rehab - Neck/Spine []Manual Therapy []Other:

## 2024-02-02 ENCOUNTER — ATHLETIC TRAINING SESSION (OUTPATIENT)
Dept: SPORTS MEDICINE | Facility: CLINIC | Age: 24
End: 2024-02-02
Payer: COMMERCIAL

## 2024-02-02 DIAGNOSIS — M25.511 ACUTE PAIN OF RIGHT SHOULDER: Primary | ICD-10-CM

## 2024-02-02 NOTE — PROGRESS NOTES
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    S/A c/o shoulder pn when throwing     Handedness: right-handed  Sport played:      Level:            Pain        ROS              Objective:       General: Neftaly is well-developed, well-nourished, appears stated age, in no acute distress, alert and oriented to time, place and person.     AT Session          Assessment:     Status: F - Full Participation    Date Seen: 2/1/24    Date of Injury:  N/a    Date Out:  N/a    Date Cleared: N/A      Plan:       1. Work on roM / decrease pn!  2. Physician Referral: no  3. ED Referral:no  4. Parent/Guardian Notified: No  5. All questions were answered, ath. will contact me for questions or concerns in  the interim.  6.         Eligible to use School Insurance: Yes    Chinmay completed:    DATE OF SERVICE: 2/2/24  Dominant Arm: right     Chinmay Came in for arm care   MODALITIES:    Cryotherapy / Thermotherapy Duration  (Mins) Add. Tx Parameters / Comment   []Cold Tub / Whirlpool (50-60 F)     []Contrast Bath (105-110 F & 50-65 F)     []Game Ready     []Hot Pack     []Hot Tub / Whirlpool ( F)     []Ice Massage     []Ice Pack     []Paraffin Wax (126-130 F)     []Vapocoolant Spray        Comment:       Electrotherapy Duration  (Mins) Add. Tx Parameters / Comment   []Combo     []E-Stim - IFC     []E-Stim - Premod     []E-Stim - Iraqi     []E-Stim - TENS     []E-Stim - Other       Comment:      Ultrasound Duty Cycle   (%) Freq.  (Mhz) Intensity   (w/cm2) Duration  (Mins) Add. Tx Parameters / Comment   []Combo        []Phonophoresis     Meds:   []Ultrasound         []Ultrasound and E-Stim          Comment:        Massage Duration  (Mins) Add. Tx Parameters / Comment   [x]Massage - IASTM     []Massage - Scar Tissue     []Massage - Self Administered     [x]Massage - Therapeutic     []Myofascial Release        Comment:      Other Modalities  Duration  (Mins)  Add. Tx Parameters / Comment   [x]Active Release     [x]Cupping     []Dry Needling     []Intermittent Compression      []Traction      []Other:       Comment:      THERAPEUTIC EXERCISES:    Stretching Rehab Other   []Stretching - PNF []Rehab - Wrist/Hand []Foam Roller []RTP - Concussion Protocol   [x]Stretching - Static []Rehab - Elbow []Functional Exercises []RTP - Sport Specific    []Rehab - Shoulder []Joint Mobilization []Strengthening Exercises    []Rehab - Neck/Spine []Manual Therapy []Other:     Comment:              Exercise Reps/Sets/Time Weight #                                                       Comment:      Miscellaneous Add. Tx Parameters / Comment   []Compression Wrap    []Support Wrap    []Taping - Preventative    []Taping - Injured Part    []Wound Care    []Other:      Comment:        DATE OF SERVICE: 2/1/24  Dominant Arm: right     Chinmay Came in for arm care   MODALITIES:    Cryotherapy / Thermotherapy Duration  (Mins) Add. Tx Parameters / Comment   []Cold Tub / Whirlpool (50-60 F)     []Contrast Bath (105-110 F & 50-65 F)     []Game Ready     []Hot Pack     []Hot Tub / Whirlpool ( F)     []Ice Massage     []Ice Pack     []Paraffin Wax (126-130 F)     []Vapocoolant Spray        Comment:       Electrotherapy Duration  (Mins) Add. Tx Parameters / Comment   []Combo     []E-Stim - IFC     []E-Stim - Premod     []E-Stim - Tajik     []E-Stim - TENS     []E-Stim - Other       Comment:      Ultrasound Duty Cycle   (%) Freq.  (Mhz) Intensity   (w/cm2) Duration  (Mins) Add. Tx Parameters / Comment   []Combo        []Phonophoresis     Meds:   []Ultrasound         []Ultrasound and E-Stim          Comment:        Massage Duration  (Mins) Add. Tx Parameters / Comment   [x]Massage - IASTM     []Massage - Scar Tissue     []Massage - Self Administered     [x]Massage - Therapeutic     []Myofascial Release        Comment:      Other Modalities Duration  (Mins)  Add. Tx Parameters / Comment    [x]Active Release     []Cupping     []Dry Needling     []Intermittent Compression      []Traction      []Other:       Comment:      THERAPEUTIC EXERCISES:    Stretching Rehab Other   []Stretching - PNF []Rehab - Wrist/Hand []Foam Roller []RTP - Concussion Protocol   [x]Stretching - Static []Rehab - Elbow []Functional Exercises []RTP - Sport Specific    []Rehab - Shoulder []Joint Mobilization []Strengthening Exercises    []Rehab - Neck/Spine []Manual Therapy []Other:     Comment:              Exercise Reps/Sets/Time Weight #                                                       Comment:      Miscellaneous Add. Tx Parameters / Comment   []Compression Wrap    []Support Wrap    []Taping - Preventative    []Taping - Injured Part    []Wound Care    []Other:      Comment:

## 2024-02-07 ENCOUNTER — ATHLETIC TRAINING SESSION (OUTPATIENT)
Dept: SPORTS MEDICINE | Facility: CLINIC | Age: 24
End: 2024-02-07
Payer: COMMERCIAL

## 2024-02-07 DIAGNOSIS — M25.511 ACUTE PAIN OF RIGHT SHOULDER: Primary | ICD-10-CM

## 2024-02-07 NOTE — PROGRESS NOTES
Subjective:       Chief Complaint: Neftaly Cruz is a 23 y.o. male student at [employer] who had concerns including Pain of the Right Shoulder.    S/A c/o shoulder pn when throwing with RTC instability      Handedness: right-handed  Sport played:      Level:            Pain        ROS              Objective:       General: Neftaly is well-developed, well-nourished, appears stated age, in no acute distress, alert and oriented to time, place and person.     AT Session          Assessment:     Status: F - Full Participation    Date Seen: 2/1/24    Date of Injury:  N/a    Date Out:  N/a    Date Cleared: N/A      Plan:       1. Work on roM / decrease pn!  2. Physician Referral: no  3. ED Referral:no  4. Parent/Guardian Notified: No  5. All questions were answered, ath. will contact me for questions or concerns in  the interim.  6.         Eligible to use School Insurance: Yes    Chinmay completed:    DATE OF SERVICE: 2/9/24  Dominant Arm: right     Chinmay Came in for arm care   MODALITIES:    Cryotherapy / Thermotherapy Duration  (Mins) Add. Tx Parameters / Comment   []Cold Tub / Whirlpool (50-60 F)     []Contrast Bath (105-110 F & 50-65 F)     []Game Ready     []Hot Pack     []Hot Tub / Whirlpool ( F)     []Ice Massage     []Ice Pack     []Paraffin Wax (126-130 F)     []Vapocoolant Spray        Comment:       Electrotherapy Duration  (Mins) Add. Tx Parameters / Comment   []Combo     []E-Stim - IFC     []E-Stim - Premod     []E-Stim - Tunisian     []E-Stim - TENS     []E-Stim - Other       Comment:      Ultrasound Duty Cycle   (%) Freq.  (Mhz) Intensity   (w/cm2) Duration  (Mins) Add. Tx Parameters / Comment   []Combo        []Phonophoresis     Meds:   []Ultrasound         []Ultrasound and E-Stim          Comment:        Massage Duration  (Mins) Add. Tx Parameters / Comment   [x]Massage - IASTM     []Massage - Scar Tissue     []Massage - Self Administered     [x]Massage - Therapeutic     []Myofascial Release         Comment:      Other Modalities Duration  (Mins)  Add. Tx Parameters / Comment   []Active Release     []Cupping     []Dry Needling     []Intermittent Compression      []Traction      []Other:       Comment:      THERAPEUTIC EXERCISES:    Stretching Rehab Other   []Stretching - PNF []Rehab - Wrist/Hand []Foam Roller []RTP - Concussion Protocol   [x]Stretching - Static []Rehab - Elbow []Functional Exercises []RTP - Sport Specific    []Rehab - Shoulder []Joint Mobilization []Strengthening Exercises    []Rehab - Neck/Spine []Manual Therapy []Other:     Comment:              Exercise Reps/Sets/Time Weight #                                                       Comment:      Miscellaneous Add. Tx Parameters / Comment   []Compression Wrap    []Support Wrap    []Taping - Preventative    []Taping - Injured Part    []Wound Care    []Other:      Comment:        DATE OF SERVICE: 2/7/24  Dominant Arm: right     Chinmay Came in for arm care   MODALITIES:    Cryotherapy / Thermotherapy Duration  (Mins) Add. Tx Parameters / Comment   []Cold Tub / Whirlpool (50-60 F)     []Contrast Bath (105-110 F & 50-65 F)     []Game Ready     []Hot Pack     []Hot Tub / Whirlpool ( F)     []Ice Massage     []Ice Pack     []Paraffin Wax (126-130 F)     []Vapocoolant Spray        Comment:       Electrotherapy Duration  (Mins) Add. Tx Parameters / Comment   []Combo     []E-Stim - IFC     []E-Stim - Premod     []E-Stim - Macanese     []E-Stim - TENS     []E-Stim - Other       Comment:      Ultrasound Duty Cycle   (%) Freq.  (Mhz) Intensity   (w/cm2) Duration  (Mins) Add. Tx Parameters / Comment   []Combo        []Phonophoresis     Meds:   []Ultrasound         []Ultrasound and E-Stim          Comment:        Massage Duration  (Mins) Add. Tx Parameters / Comment   [x]Massage - IASTM     []Massage - Scar Tissue     []Massage - Self Administered     [x]Massage - Therapeutic     []Myofascial Release        Comment:      Other Modalities  Duration  (Mins)  Add. Tx Parameters / Comment   [x]Active Release     [x]Cupping     []Dry Needling     []Intermittent Compression      []Traction      []Other:       Comment:      THERAPEUTIC EXERCISES:    Stretching Rehab Other   []Stretching - PNF []Rehab - Wrist/Hand []Foam Roller []RTP - Concussion Protocol   [x]Stretching - Static []Rehab - Elbow []Functional Exercises []RTP - Sport Specific    []Rehab - Shoulder []Joint Mobilization []Strengthening Exercises    []Rehab - Neck/Spine []Manual Therapy []Other:     Comment:              Exercise Reps/Sets/Time Weight #                                                       Comment:      Miscellaneous Add. Tx Parameters / Comment   []Compression Wrap    []Support Wrap    []Taping - Preventative    []Taping - Injured Part    []Wound Care    []Other:      Comment:

## 2024-05-17 DIAGNOSIS — M25.519 PAIN IN UNSPECIFIED SHOULDER: Primary | ICD-10-CM

## 2024-05-28 ENCOUNTER — HOSPITAL ENCOUNTER (OUTPATIENT)
Dept: RADIOLOGY | Facility: HOSPITAL | Age: 24
Discharge: HOME | End: 2024-05-28
Attending: ORTHOPAEDIC SURGERY
Payer: COMMERCIAL

## 2024-05-28 DIAGNOSIS — M25.519 PAIN IN UNSPECIFIED SHOULDER: ICD-10-CM

## 2024-05-28 PROCEDURE — 73221 MRI JOINT UPR EXTREM W/O DYE: CPT | Mod: 26,RT,, | Performed by: RADIOLOGY

## 2024-05-28 PROCEDURE — 73221 MRI JOINT UPR EXTREM W/O DYE: CPT | Mod: TC,RT

## 2024-06-04 NOTE — PROGRESS NOTES
CC: right shoulder pain    Chinmay is here today for follow up evaluation of his right shoulder pain and to discuss his MRI results. Recall is a recently graduated  at [employer]. Patient reports his pain is 0/10 today but gets worse with overhead activity and lifting weights. He admits to continuing to appreciate shoulder pain that is increased with reaching behind him and holding things with an outstretched arm as well. He has not thrown recently and denies new falls or trauma. He points to his posterior shoulder when asked where he feels most of his pain.    Recall from visit on 02/20/2023  Chinmay is here today for follow up evaluation of his right shoulder pain and to discuss his MRI results. Patient reports his pain is 0/10 today. He is accompanied today by his  who was present for the duration of the visit today. He states he has continued to work with his  doing rehab and scapular strengthening and has not returned to throwing. He denies new falls or trauma since his last visit.     Recall from visit on 01/26/2023  23 y.o. Male presents today for evaluation of his right shoulder pain. 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 He notes increased pain with throwing and denies pain with lifting. He gestures to the anterior aspect of the right shoulder when asked where he hurts. He is accompanied today by his  who was present for the duration of the visit today.   How long: Approximately 2 weeks  What makes it better: Rest, lowering his throwing intensity, occasionally with lifting overhead  What makes it worse: Throwing above 50-60% intensity  Does it radiate: Denies  Attempted treatments: He has been working with his  including cupping, E-stim, and stabilizing/strengthening exercises for the shoulder. He denies taking medications for this problem.   Pain  "score: 1/10  Any mechanical symptoms: Denies  Feelings of instability: Denies  Affecting ADLs: Denies. Only feels when throwing >50% velocity.      PAST MEDICAL HISTORY:   History reviewed. No pertinent past medical history.    PAST SURGICAL HISTORY:   History reviewed. No pertinent surgical history.    FAMILY HISTORY:   No family history on file.    SOCIAL HISTORY:   Social History     Socioeconomic History    Marital status: Unknown       MEDICATIONS:   No current outpatient medications on file.    ALLERGIES:   Review of patient's allergies indicates:  No Known Allergies     PHYSICAL EXAMINATION:  /87   Pulse 67   Ht 6' 1" (1.854 m)   Wt 77.1 kg (169 lb 15.6 oz)   BMI 22.43 kg/m²   Vitals signs and nursing note have been reviewed.  General: In no acute distress, well developed, well nourished, no diaphoresis  Eyes: EOM full and smooth, no eye redness or discharge  HENT: normocephalic and atraumatic, neck supple, trachea midline, no nasal discharge, no external ear redness or discharge  Cardiovascular: 2+ and symmetric radial bilaterally, no LE edema  Lungs: respirations non-labored, no conversational dyspnea   Abd: non-distended, no rigidity  MSK: no amputation or deformity, no swelling of extremities  Neuro: AAOx3, CN2-12 grossly intact  Skin: No rashes, warm and dry  Psychiatric: cooperative, pleasant, mood and affect appropriate for age    SHOULDER: RIGHT  The affected shoulder is compared to the contralateral shoulder.    Observation:    CERVICAL SPINE  Anterior head carriage. Normal thoracic kyphosis.  Full AROM in flexion, extension, sidebending, and rotation.    SHOULDER  No ecchymosis, edema, or erythema throughout the shoulder girdle.  No sternal, clavicular, or acromial deformities bilaterally.  No atrophy of the pectorals, deltoids, supraspinatus, infraspinatus, or biceps bilaterally.  No asymmetry of shoulders bilaterally.    ROM:  Active flexion to 180° on left and 180° on right.   Active " abduction to 180° on left and 180° on right.    Active internal rotation to T7 on left and T7 on right.    Active external rotation to T4 on left and T4 on right.    No scapular dyskinesia or winging.    Tenderness:  No tenderness at the SC or AC joint  No tenderness over the clavicle   No tenderness over biceps tendon in the bicipital groove  No tenderness over subacromial space  + tenderness over the posterior shoulder  + tenderness just lateral to the biceps tendon anterior shoulder    Strength Testing:  Deltoid - 5/5 on left and 5/5 on right  Biceps - 5/5 on left and 5/5 on right  Triceps - 5/5 on left and 5/5 on right  Wrist extension - 5/5 on left and 5/5 on right  Wrist flexion - 5/5 on left and 5/5 on right   - 5/5 on left and 5/5 on right  Finger extension - 5/5 on left and 5/5 on right  Finger abduction - 5/5 on left and 5/5 on right    Special Tests:  Empty can test - negative  Full can test - negative  Bear hug test - negative  Belly press test - negative  Resisted internal rotation - negative  Resisted external rotation - positive pain    Neer's test - negative  Hawkin's-Oliver test - positive    OAbdiels test - negative    Biceps load 1 test - negative  Biceps load 2 test - negative  Staples sheer test - positive    Speed's test - negative  Yergason's test - negative    Sulcus sign - none  AP load and shift laxity - none  Anterior apprehension test - negative      Neurovascular Exam:  2+ radial pulses BL  Sensation intact to light touch in the distal median, radial, and ulnar nerve distributions bilaterally.  Spurlings test - negative  Lhermittes test - negative  Capillary refill intact <2 seconds in all digits bilaterally      IMAGIN. X-ray obtained 2023 due to right shoulder pain   2. X-ray images were reviewed personally by me and then directly with patient.  3. FINDINGS: X-ray images obtained demonstrate no fracture or dislocation, no joint space narrowing  4. IMPRESSION: As above.  "    1. MRI obtained 02/17/2023 due to right shoulder pain   2. MRI images were reviewed personally by me and then directly with patient.  3. FINDINGS: MRI images obtained demonstrate findings concerning for posterior impingement including SLAP tear, mild fraying and tendinosis of the articular surface conjoined area of the infraspinatus and supraspinatus tendon, osseous cystic changes and edema of the posterolateral humeral head underlying the infraspinatus tendon attachment  4. IMPRESSION: As above.     1. MRI obtained 05/28/2024 due to right shoulder pain   2. MRI images were reviewed personally by me and then directly with patient.  3. FINDINGS: MRI images obtained demonstrate posterior labral tear superior to inferior, with associated posteroinferior periosteal and capsulolabral thickening and edema. Infraspinatus tendinosis. Subcortical edema and cystic change of the posterolateral humeral head.  4. IMPRESSION: As above.       ASSESSMENT:      ICD-10-CM ICD-9-CM   1. Acute pain of right shoulder  M25.511 719.41   2. Tendinosis of rotator cuff  M67.819 726.10   3. Labral tear of shoulder, right, subsequent encounter  S43.431D V58.89     840.8         PLAN:  1-3. Acute right shoulder pain/labral tear/tendinosis -     - Chinmay is a [employer]  here today for follow up evaluation of his right shoulder pain and to discuss his most recent MRI results. He is graduated but may choose to play more baseball even after graduation.  Recall that he has had an intra-articular injection into his right shoulder last season which provided him with pain relief.  He has also been consistent with shoulder and scapular strengthening and stabilizing and physical therapy over the past few years but still appreciates pain and "shifting" of the posterior shoulder.    - MRI obtained 05/28/2024 and images were personally reviewed with the patient. See above for further detail.    - After discussing options, he is interested in " discussing surgical options to repair the labrum.  He is moving to [place] in the near future and is going to explore options both in [place] and [place].  I discussed an appointment with Dr. Zepeda and he is going to check on insurance coverage and will let me know if he would like this appointment scheduled.    - Contact has been made with their  who is on board with the plan.       Future planning includes - referral to Dr. Zepeda     All questions were answered to the best of my ability and all concerns were addressed at this time.    Possible follow up with Dr. Zepeda.       This note is dictated using the M*Modal Fluency Direct word recognition program. There are word recognition mistakes that are occasionally missed on review.      Total time spent face-to face with patient counseling or coordinating care including prognosis, differential diagnosis, risks and benefits of treatment, instructions, compliance risk reductions as well as non-face-to-face time personally spent reviewing medial record, medical documentation, and coordination of care.     EST MINUTES X   [id number] 10-19    [id number] 20-29    [id number] 30-39 X   [id number] 40-54    NEW     37331 15-29    [id number] 30-44    99415 45-59    [id number] 60-74    PHONE      5-10    [id number] 11-20    [id number] 21-30

## 2024-06-06 ENCOUNTER — OFFICE VISIT (OUTPATIENT)
Dept: SPORTS MEDICINE | Facility: CLINIC | Age: 24
End: 2024-06-06
Payer: COMMERCIAL

## 2024-06-06 VITALS
DIASTOLIC BLOOD PRESSURE: 87 MMHG | WEIGHT: 170 LBS | HEART RATE: 67 BPM | BODY MASS INDEX: 22.53 KG/M2 | HEIGHT: 73 IN | SYSTOLIC BLOOD PRESSURE: 132 MMHG

## 2024-06-06 DIAGNOSIS — M25.511 ACUTE PAIN OF RIGHT SHOULDER: Primary | ICD-10-CM

## 2024-06-06 DIAGNOSIS — S43.431D LABRAL TEAR OF SHOULDER, RIGHT, SUBSEQUENT ENCOUNTER: ICD-10-CM

## 2024-06-06 DIAGNOSIS — M67.819 TENDINOSIS OF ROTATOR CUFF: ICD-10-CM

## 2024-06-06 PROCEDURE — 3075F SYST BP GE 130 - 139MM HG: CPT | Mod: CPTII,S$GLB,, | Performed by: ORTHOPAEDIC SURGERY

## 2024-06-06 PROCEDURE — 1160F RVW MEDS BY RX/DR IN RCRD: CPT | Mod: CPTII,S$GLB,, | Performed by: ORTHOPAEDIC SURGERY

## 2024-06-06 PROCEDURE — 3079F DIAST BP 80-89 MM HG: CPT | Mod: CPTII,S$GLB,, | Performed by: ORTHOPAEDIC SURGERY

## 2024-06-06 PROCEDURE — 99999 PR PBB SHADOW E&M-EST. PATIENT-LVL III: CPT | Mod: PBBFAC,,, | Performed by: ORTHOPAEDIC SURGERY

## 2024-06-06 PROCEDURE — 99214 OFFICE O/P EST MOD 30 MIN: CPT | Mod: S$GLB,,, | Performed by: ORTHOPAEDIC SURGERY

## 2024-06-06 PROCEDURE — 3008F BODY MASS INDEX DOCD: CPT | Mod: CPTII,S$GLB,, | Performed by: ORTHOPAEDIC SURGERY

## 2024-06-06 PROCEDURE — 1159F MED LIST DOCD IN RCRD: CPT | Mod: CPTII,S$GLB,, | Performed by: ORTHOPAEDIC SURGERY
